# Patient Record
Sex: FEMALE | Race: BLACK OR AFRICAN AMERICAN | Employment: FULL TIME | URBAN - METROPOLITAN AREA
[De-identification: names, ages, dates, MRNs, and addresses within clinical notes are randomized per-mention and may not be internally consistent; named-entity substitution may affect disease eponyms.]

---

## 2020-06-29 ENCOUNTER — TELEPHONE (OUTPATIENT)
Dept: FAMILY MEDICINE CLINIC | Age: 65
End: 2020-06-29

## 2020-06-29 NOTE — TELEPHONE ENCOUNTER
Outbound call placed to patient. name and  verified. Call placed to reschedule patients appointment due to COVID-19 pandemic. Patient rescheduled to virtual visit with Dr. Diane Dorado on 2020. Patient reported understanding and appreciative of call.

## 2020-11-23 ENCOUNTER — OFFICE VISIT (OUTPATIENT)
Dept: FAMILY MEDICINE CLINIC | Age: 65
End: 2020-11-23
Payer: COMMERCIAL

## 2020-11-23 VITALS
DIASTOLIC BLOOD PRESSURE: 87 MMHG | SYSTOLIC BLOOD PRESSURE: 135 MMHG | HEIGHT: 67 IN | BODY MASS INDEX: 39.49 KG/M2 | RESPIRATION RATE: 24 BRPM | HEART RATE: 68 BPM | TEMPERATURE: 97.4 F | OXYGEN SATURATION: 97 % | WEIGHT: 251.6 LBS

## 2020-11-23 DIAGNOSIS — Z12.11 SCREENING FOR COLON CANCER: ICD-10-CM

## 2020-11-23 DIAGNOSIS — Z86.19 HISTORY OF HEPATITIS C: ICD-10-CM

## 2020-11-23 DIAGNOSIS — Z00.00 ROUTINE GENERAL MEDICAL EXAMINATION AT HEALTH CARE FACILITY: Primary | ICD-10-CM

## 2020-11-23 DIAGNOSIS — Z78.0 POSTMENOPAUSAL: ICD-10-CM

## 2020-11-23 DIAGNOSIS — M79.89 MASS OF SOFT TISSUE OF FACE: ICD-10-CM

## 2020-11-23 DIAGNOSIS — Z76.89 ENCOUNTER TO ESTABLISH CARE WITH NEW DOCTOR: ICD-10-CM

## 2020-11-23 DIAGNOSIS — Z13.220 SCREENING FOR LIPID DISORDERS: ICD-10-CM

## 2020-11-23 DIAGNOSIS — Z13.820 SCREENING FOR OSTEOPOROSIS: ICD-10-CM

## 2020-11-23 DIAGNOSIS — Z12.31 VISIT FOR SCREENING MAMMOGRAM: ICD-10-CM

## 2020-11-23 DIAGNOSIS — E66.01 SEVERE OBESITY (HCC): ICD-10-CM

## 2020-11-23 PROCEDURE — 99203 OFFICE O/P NEW LOW 30 MIN: CPT | Performed by: FAMILY MEDICINE

## 2020-11-23 PROCEDURE — 99387 INIT PM E/M NEW PAT 65+ YRS: CPT | Performed by: FAMILY MEDICINE

## 2020-11-23 RX ORDER — MELATONIN 10 MG/ML
DROPS ORAL
COMMUNITY

## 2020-11-23 NOTE — PROGRESS NOTES
Patient Name: Jc Lyles   MRN: [id number]    Svetlana Parikh is a 77 y.o. female who presents with the following: Here to establish care with new PCP. Colon Cancer Screening: reviewed screening modalities & elected for FOBT. Breast Cancer Screening: not up to date - ordered  DEXA: due  Hep C: hx of Hep C. Did Interferon B x 6 months in 1997. CAD risk factors:  HTN: wnl  BP Readings from Last 3 Encounters:   11/23/20 135/87     Lipid: due  DM: due    Accidentally hit her forehead with a gas pump in May. One month later, a bump developed on her forehead. Is slightly smaller than before but feels soft and squishy. Denies pain. Review of Systems   Constitutional: Negative for chills, fever, malaise/fatigue and weight loss. HENT: Negative for hearing loss, nosebleeds and sore throat. Respiratory: Negative for cough, sputum production, shortness of breath and wheezing. Cardiovascular: Negative for chest pain, palpitations, leg swelling and PND. Gastrointestinal: Negative for abdominal pain, blood in stool, constipation, diarrhea, nausea and vomiting. Genitourinary: Negative for dysuria, frequency and urgency. Musculoskeletal: Negative for back pain, falls, joint pain, myalgias and neck pain. Skin: Negative for itching and rash. Neurological: Negative for dizziness, sensory change, focal weakness and loss of consciousness. Psychiatric/Behavioral: Negative for depression. The patient is not nervous/anxious. All other systems reviewed and are negative. The patient's medications, allergies, past medical history, surgical history, family history and social history were reviewed and updated where appropriate. Prior to Admission medications    Medication Sig Start Date End Date Taking? Authorizing Provider   ascorbic acid (VITAMIN C DROPS PO) Take  by mouth. Yes Provider, Historical   Cholecalciferol, Vitamin D3, 10 mcg/drop (400 unit/drop) drop Take  by mouth. Yes Provider, Historical   B-complex with vitamin C (VITAMIN B COMPLEX-C PO) Take  by mouth. Yes Provider, Historical   MILK THISTLE PO Take  by mouth. Yes Provider, Historical   TURMERIC PO Take  by mouth. Yes Provider, Historical   GARLIC PO Take  by mouth. Yes Provider, Historical   CAPSICUM, CAYENNE, PO Take  by mouth. Yes Provider, Historical   green tea leaf extract (GREEN TEA PO) Take  by mouth.    Yes Provider, Historical       No Known Allergies      Past Medical History:   Diagnosis Date    Asthma     Hepatitis C 1997       Past Surgical History:   Procedure Laterality Date    HX BREAST LUMPECTOMY  1997       Family History   Problem Relation Age of Onset    Diabetes Mother     Diabetes Father        Social History     Socioeconomic History    Marital status:      Spouse name: Not on file    Number of children: Not on file    Years of education: Not on file    Highest education level: Not on file   Occupational History    Not on file   Social Needs    Financial resource strain: Not on file    Food insecurity     Worry: Not on file     Inability: Not on file    Transportation needs     Medical: Not on file     Non-medical: Not on file   Tobacco Use    Smoking status: Current Every Day Smoker     Packs/day: 8.00     Years: 20.00     Pack years: 160.00    Smokeless tobacco: Never Used   Substance and Sexual Activity    Alcohol use: Yes     Comment: occasional    Drug use: Not Currently     Types: Cocaine, Marijuana     Comment: 30 years ago    Sexual activity: Not Currently   Lifestyle    Physical activity     Days per week: Not on file     Minutes per session: Not on file    Stress: Not on file   Relationships    Social connections     Talks on phone: Not on file     Gets together: Not on file     Attends [employer] service: Not on file     Active member of club or organization: Not on file     Attends meetings of clubs or organizations: Not on file     Relationship status: Not on file    Intimate partner violence     Fear of current or ex partner: Not on file     Emotionally abused: Not on file     Physically abused: Not on file     Forced sexual activity: Not on file   Other Topics Concern    Not on file   Social History Narrative    Not on file         OBJECTIVE    Visit Vitals  /87   Pulse 68   Temp 97.4 °F (36.3 °C) (Temporal)   Resp 24   Ht 5' 7\" (1.702 m)   Wt 251 lb 9.6 oz (114.1 kg)   SpO2 97%   BMI 39.41 kg/m²       Physical Exam  Vitals signs and nursing note reviewed. Constitutional:       General: She is not in acute distress. Appearance: She is not diaphoretic. Eyes:      Conjunctiva/sclera: Conjunctivae normal.      Pupils: Pupils are equal, round, and reactive to light. Cardiovascular:      Rate and Rhythm: Normal rate and regular rhythm. Heart sounds: Normal heart sounds. No murmur. No friction rub. No gallop. Pulmonary:      Effort: Pulmonary effort is normal. No respiratory distress. Breath sounds: Normal breath sounds. No wheezing. Skin:     General: Skin is warm and dry. Comments: 3 x 3 cm palpable soft tissue mass along left forehead   Neurological:      Mental Status: She is alert. ASSESSMENT AND PLAN  Alfonso Tabor is a 77 y.o. female who presents today for:    1. Routine general medical examination at health care facility  Reviewed age appropriate screening tests as recommended by the [place]PSTF Preventive Services Database with patient today. 2. Encounter to establish care with new doctor    3. History of hepatitis C  - HCV AB W/RFLX TO ARLIN; Future  - HCV AB W/RFLX TO ARLIN    4. Mass of soft tissue of face  Will evaluate with US. Suspect possible hematoma; may need drainage by plastic surgery. - US HEAD NECK SOFT TISSUE; Future    5. Screening for lipid disorders  - CBC W/O DIFF; Future  - METABOLIC PANEL, COMPREHENSIVE; Future  - LIPID PANEL;  Future  - LIPID PANEL  - METABOLIC PANEL, COMPREHENSIVE  - CBC W/O DIFF    6. Screening for colon cancer  - COLOGUARD TEST (FECAL DNA COLORECTAL CANCER SCREENING)    7. Visit for screening mammogram  - [facility] MAMMO BI SCREENING INCL CAD; Future    8. Screening for osteoporosis  - DEXA BONE DENSITY STUDY AXIAL; Future    9. Postmenopausal  - DEXA BONE DENSITY STUDY AXIAL; Future    10. Severe obesity ([facility] [address]  I have reviewed/discussed the above normal BMI with the patient. I have recommended the following interventions: dietary management education, guidance, and counseling, encourage exercise, monitor weight and prescribed dietary intake. There are no discontinued medications. Follow-up and Dispositions    · Return in about 1 year (around 11/23/2021) for CPE (30 min). Treatment risks/benefits/costs/interactions/alternatives discussed with patient. Advised patient to call back or return to office if symptoms worsen/change/persist. If patient cannot reach us or should anything more severe/urgent arise he/she should proceed directly to the nearest emergency department. Discussed expected course/resolution/complications of diagnosis in detail with patient. Patient expressed understanding with the diagnosis and plan. Andrew Swanson M.D.

## 2020-11-23 NOTE — ASSESSMENT & PLAN NOTE
Uncontrolled, based on history, physical exam and review of pertinent labs, studies and medications; meds reconciled; continue current treatment plan. Key Obesity Meds     Patient is on no anti-obesity meds.         No results found for: LEPTN, INSUL, HBA1C, GLU, CHOL, CHOLPOCT, HDL, LDLC, LDL, LDLCEXT, LDLCPOC, TRIGL, TGLPOCT, TSH, NA, NAPOC, K, KPOCT, ALTPOC, ALT, ASTPOC, VITD3, CRP, [id number], TSHEXT

## 2020-11-23 NOTE — PATIENT INSTRUCTIONS
Colon Cancer Screening: Care Instructions Your Care Instructions Colorectal cancer occurs in the colon or rectum. That's the lower part of your digestive system. It is the second-leading cause of cancer deaths in the [place]. It often starts with small growths called polyps in the colon or rectum. Polyps are usually found with screening tests. Depending on the type of test, any polyps found may be removed during the tests. Colorectal cancer usually does not cause symptoms at first. But regular tests can help find it early, before it spreads and becomes harder to treat. Your risk for colorectal cancer gets higher as you get older. Some experts say that adults should start regular screening at age 48 and stop at age 76. Others say to start before age 48 or continue after age 76. Talk with your doctor about your risk and when to start and stop screening. You may have one of several tests. Follow-up care is a key part of your treatment and safety. Be sure to make and go to all appointments, and call your doctor if you are having problems. It's also a good idea to know your test results and keep a list of the medicines you take. What are the main screening tests for colon cancer? The screening tests are: 
Stool tests. These include the guaiac fecal occult blood test (gFOBT), the fecal immunochemical test (FIT), and the combined fecal immunochemical test and stool DNA test (FIT-DNA). These tests check stool samples for signs of cancer. If your test is positive, you will need to have a colonoscopy. Sigmoidoscopy. This test lets your doctor look at the lining of your rectum and the lowest part of your colon. Your doctor uses a lighted tube called a sigmoidoscope. This test can't find cancers or polyps in the upper part of your colon. In some cases, polyps that are found can be removed. But if your doctor finds polyps, you will need to have a colonoscopy to check the upper part of your colon. Colonoscopy. This test lets your doctor look at the lining of your rectum and your entire colon. The doctor uses a thin, flexible tool called a colonoscope. It can also be used to remove polyps or get a tissue sample (biopsy). A less common test is CT colonography (CTC). It's also called virtual colonoscopy. Who should be screened for colorectal cancer? Your risk for colorectal cancer gets higher as you get older. Some experts say that adults should start regular screening at age 48 and stop at age 76. Others say to start before age 48 or continue after age 76. Talk with your doctor about your risk and when to start and stop screening. How often you need screening depends on the type of test you get: 
Stool tests. Every 1 or 2 years for FIT or gFOBT. Every 3 years for sDNA, also called FIT-DNA. Tests that look inside the colon. Every 5 or 10 years for sigmoidoscopy. Every 5 years for CT colonography (virtual colonoscopy). Every 10 years for colonoscopy. Experts agree that people at higher risk may need to be tested sooner. This includes people who have a strong family history of colon cancer. Talk to your doctor about which test is best for you and when to be tested. When should you call for help? Watch closely for changes in your health, and be sure to contact your doctor if: 
  · You have any changes in your bowel habits.  
  · You have any problems. Where can you learn more? Go to http://www.gray.com/ Enter M541 in the search box to learn more about \"Colon Cancer Screening: Care Instructions. \" Current as of: April 29, 2020               Content Version: 12.6 © 2618-0299 [employer]. Care instructions adapted under license by [employer] (which disclaims liability or warranty for this information).  If you have questions about a medical condition or this instruction, always ask your healthcare professional. [address] any warranty or liability for your use of this information.

## 2020-11-23 NOTE — PROGRESS NOTES
Chief Complaint   Patient presents with   Lauren Lopez [facility]       1. Have you been to the ER, urgent care clinic since your last visit? Hospitalized since your last visit? No    2. Have you seen or consulted any other health care providers outside of the [address] [place] [address] since your last visit? Include any pap smears or colon screening. No    Health Maintenance Due   Topic Date Due    Hepatitis C Screening  1955    DTaP/Tdap/Td series (1 - Tdap) 06/14/1974    Lipid Screen  06/14/1993    Shingrix Vaccine Age 50> (1 of 2) 06/14/2003    Colorectal Cancer Screening Combo  06/14/2003    Breast Cancer Screen Mammogram  06/14/2003    GLAUCOMA SCREENING Q2Y  06/14/2018    Bone Densitometry (Dexa) Screening  06/14/2018    Pneumococcal 65+ years (1 of 1 - PPSV23) 06/14/2018    Flu Vaccine (1) 09/01/2020     Pt declines all vaccines. Pt had mammogram 2 years ago, but advised not to get them anymore. Pt has not had colonoscopy since 1980's  Glaucoma Screening done at [place] [address] 4 months ago. 3 most recent PHQ Screens 11/23/2020   Little interest or pleasure in doing things Not at all   Feeling down, depressed, irritable, or hopeless Not at all   Total Score PHQ 2 0       Abuse Screening Questionnaire 11/23/2020   Do you ever feel afraid of your partner? N   Are you in a relationship with someone who physically or mentally threatens you? N   Is it safe for you to go home? Y       No flowsheet data found.

## 2020-11-30 ENCOUNTER — TELEPHONE (OUTPATIENT)
Dept: FAMILY MEDICINE CLINIC | Age: 65
End: 2020-11-30

## 2020-11-30 DIAGNOSIS — Z86.19 HISTORY OF HEPATITIS C: Primary | ICD-10-CM

## 2020-11-30 LAB
ALBUMIN SERPL-MCNC: 3.7 G/DL (ref 3.5–5)
ALBUMIN/GLOB SERPL: 0.9 {RATIO} (ref 1.1–2.2)
ALP SERPL-CCNC: 83 U/L (ref 45–117)
ALT SERPL-CCNC: 48 U/L (ref 12–78)
ANION GAP SERPL CALC-SCNC: 10 MMOL/L (ref 5–15)
AST SERPL-CCNC: 29 U/L (ref 15–37)
BILIRUB SERPL-MCNC: 0.4 MG/DL (ref 0.2–1)
BUN SERPL-MCNC: 20 MG/DL (ref 6–20)
BUN/CREAT SERPL: 14 (ref 12–20)
CALCIUM SERPL-MCNC: 9.6 MG/DL (ref 8.5–10.1)
CHLORIDE SERPL-SCNC: 104 MMOL/L (ref 97–108)
CHOLEST SERPL-MCNC: 200 MG/DL
CO2 SERPL-SCNC: 25 MMOL/L (ref 21–32)
CREAT SERPL-MCNC: 1.46 MG/DL (ref 0.55–1.02)
ERYTHROCYTE [DISTWIDTH] IN BLOOD BY AUTOMATED COUNT: 13.2 % (ref 11.5–14.5)
GLOBULIN SER CALC-MCNC: 4 G/DL (ref 2–4)
GLUCOSE SERPL-MCNC: 92 MG/DL (ref 65–100)
HCT VFR BLD AUTO: 45.4 % (ref 35–47)
HCV AB S/CO SERPL IA: >11 S/CO RATIO (ref 0–0.9)
HCV RNA SERPL QL NAA+PROBE: NORMAL
HCV RNA SERPL QL NAA+PROBE: POSITIVE
HDLC SERPL-MCNC: 38 MG/DL
HDLC SERPL: 5.3 {RATIO} (ref 0–5)
HGB BLD-MCNC: 14.4 G/DL (ref 11.5–16)
LDLC SERPL CALC-MCNC: 127.4 MG/DL (ref 0–100)
LIPID PROFILE,FLP: ABNORMAL
MCH RBC QN AUTO: 30.6 PG (ref 26–34)
MCHC RBC AUTO-ENTMCNC: 31.7 G/DL (ref 30–36.5)
MCV RBC AUTO: 96.4 FL (ref 80–99)
NRBC # BLD: 0 K/UL (ref 0–0.01)
NRBC BLD-RTO: 0 PER 100 WBC
PLATELET # BLD AUTO: 344 K/UL (ref 150–400)
PMV BLD AUTO: 10.4 FL (ref 8.9–12.9)
POTASSIUM SERPL-SCNC: 4.7 MMOL/L (ref 3.5–5.1)
PROT SERPL-MCNC: 7.7 G/DL (ref 6.4–8.2)
RBC # BLD AUTO: 4.71 M/UL (ref 3.8–5.2)
SODIUM SERPL-SCNC: 139 MMOL/L (ref 136–145)
TRIGL SERPL-MCNC: 173 MG/DL (ref ?–150)
VLDLC SERPL CALC-MCNC: 34.6 MG/DL
WBC # BLD AUTO: 11.1 K/UL (ref 3.6–11)

## 2020-11-30 NOTE — TELEPHONE ENCOUNTER
Called, spoke to pt. Two pt identifiers confirmed. Writer informed patient that her referral had been mailed for Liver MD. Pt verbalized understanding of information discussed w/ no further questions at this time.

## 2020-12-16 ENCOUNTER — HOSPITAL ENCOUNTER (OUTPATIENT)
Dept: MAMMOGRAPHY | Age: 65
Discharge: HOME | End: 2020-12-16
Attending: FAMILY MEDICINE
Payer: COMMERCIAL

## 2020-12-16 ENCOUNTER — HOSPITAL ENCOUNTER (OUTPATIENT)
Dept: ULTRASOUND IMAGING | Age: 65
Discharge: HOME | End: 2020-12-16
Attending: FAMILY MEDICINE
Payer: COMMERCIAL

## 2020-12-16 DIAGNOSIS — Z13.820 SCREENING FOR OSTEOPOROSIS: ICD-10-CM

## 2020-12-16 DIAGNOSIS — M79.89 MASS OF SOFT TISSUE OF FACE: ICD-10-CM

## 2020-12-16 DIAGNOSIS — Z12.31 VISIT FOR SCREENING MAMMOGRAM: ICD-10-CM

## 2020-12-16 DIAGNOSIS — Z78.0 POSTMENOPAUSAL: ICD-10-CM

## 2020-12-16 PROCEDURE — 77080 DXA BONE DENSITY AXIAL: CPT

## 2020-12-16 PROCEDURE — 77067 SCR MAMMO BI INCL CAD: CPT

## 2020-12-16 PROCEDURE — 76536 US EXAM OF HEAD AND NECK: CPT

## 2020-12-21 DIAGNOSIS — M79.89 MASS OF SOFT TISSUE OF FACE: Primary | ICD-10-CM

## 2020-12-28 ENCOUNTER — TELEPHONE (OUTPATIENT)
Dept: FAMILY MEDICINE CLINIC | Age: 65
End: 2020-12-28

## 2021-01-05 ENCOUNTER — OFFICE VISIT (OUTPATIENT)
Dept: HEMATOLOGY | Age: 66
End: 2021-01-05
Payer: COMMERCIAL

## 2021-01-05 VITALS
HEIGHT: 67 IN | SYSTOLIC BLOOD PRESSURE: 135 MMHG | RESPIRATION RATE: 17 BRPM | HEART RATE: 78 BPM | TEMPERATURE: 96.1 F | DIASTOLIC BLOOD PRESSURE: 77 MMHG | BODY MASS INDEX: 39.08 KG/M2 | OXYGEN SATURATION: 98 % | WEIGHT: 249 LBS

## 2021-01-05 DIAGNOSIS — B18.2 CHRONIC HEPATITIS C WITHOUT HEPATIC COMA (HCC): Primary | ICD-10-CM

## 2021-01-05 PROCEDURE — 99204 OFFICE O/P NEW MOD 45 MIN: CPT | Performed by: HOSPITALIST

## 2021-01-05 NOTE — LETTER
1/5/2021 Patient: June Cooper YOB: 1955 Date of Visit: 1/5/2021 Nisreen Dubon MD 
[address] [facility] [address] [address] [address] [employer] Dear Nisreen Dubon MD, Thank you for referring Ms. Dallas Linares to [address] for evaluation. My notes for this consultation are attached. If you have questions, please do not hesitate to call me. I look forward to following your patient along with you. Sincerely, Catalina James MD

## 2021-01-05 NOTE — PROGRESS NOTES
Room 1    Identified pt with two pt identifiers(name and ). Reviewed record in preparation for visit and have obtained necessary documentation. All patient medications has been reviewed. No chief complaint on file. 3 most recent PHQ Screens 2020   Little interest or pleasure in doing things Not at all   Feeling down, depressed, irritable, or hopeless Not at all   Total Score PHQ 2 0     Abuse Screening Questionnaire 2020   Do you ever feel afraid of your partner? N   Are you in a relationship with someone who physically or mentally threatens you? N   Is it safe for you to go home? Y       Health Maintenance Due   Topic    GLAUCOMA SCREENING Q2Y      Health Maintenance Review: Patient reminded of \"due or due soon\" health maintenance. I have asked the patient to contact his/her primary care provider (PCP) for follow-up on his/her health maintenance. There were no vitals filed for this visit. Wt Readings from Last 3 Encounters:   20 251 lb 9.6 oz (114.1 kg)     Temp Readings from Last 3 Encounters:   20 97.4 °F (36.3 °C) (Temporal)     BP Readings from Last 3 Encounters:   20 135/87     Pulse Readings from Last 3 Encounters:   20 68       Coordination of Care Questionnaire:   1) Have you been to an emergency room, urgent care, or hospitalized since your last visit? No  2. Have seen or consulted any other health care provider since your last visit?   No

## 2021-01-05 NOTE — PROGRESS NOTES
Mel Pemberton MD, [address], [address] Martin Hernandes, Ivelisse Horne MD, MPH      Autumn Calero PA-LUPE Skinner, [facility]-BC     April S Berlin, [facility]   Skye Jackman, P-C    Susiehajajolene Hernandez, [facility]       Kelvin Gloria [address]    at [facility]    [address] [place] [address] Ave, [address] [address]  [address].    [phone]    FAX: [address] [facility] [address]    [address], [address]    [phone]    FAX: [phone]     Patient Care Team:  Titi Kuhn MD as PCP - General (Family Medicine)  Titi Kuhn MD as PCP - [address] [place] [address] Dr Martin Provider    Problem List  Date Reviewed: 1/5/2021          Codes Class Noted    Asthma ICD-10-CM: J45.909  ICD-9-CM: 493.90  Unknown        Severe obesity ([facility] [address] ICD-10-CM: E66.01  ICD-9-CM: 278.01  11/23/2020        Chronic hepatitis C without hepatic coma ([facility] [address] ICD-10-CM: B18.2  ICD-9-CM: 070.54  1997          The clinicians listed above have asked me to see   Channing Harrell in consultation regarding chronic HCV and its management. All medical records sent by the referring physicians were reviewed     The patient is a 77year old Black female who was screened for anti-HCV and tested positive in 1997      Risk factors for acquiring HCV are inhaling cocaine in the 1970's. Patient last inhaled cocaine in the 1970s. There was no history of an episode of acute icteric hepatitis at the time of these risk factors. Imaging of the liver was not performed. An assessment of liver fibrosis with biopsy or elastography has not been performed. The patient was treated with standard interferon- 2 shots per day for 6 months in 1997.     The patient tolerated treatment reasonably well and was told she was cured    Patient most recent HCV RNA QL performed  On 11/2020 was positive    The patient has no symptoms which can be attributed to the liver disorder. The patient is not currently experiencing the following symptoms of liver disease: fatigue, pain in the right side over the liver,   yellowing of the eyes or skin, problems concentrating, swelling of the abdomen, swelling of the lower extremities, hematemesis, hematochezia. The patient completes all daily activities without any functional limitations. ASSESSMENT AND PLAN:  Chronic HCV   Chronic HCV of unclear severity. Liver transaminases are normal. ALP is normal. Liver function is normal. The platelet count is normal.    Will perform and/or review results of HCV viral load, HCV genotype to define the specific treatment and duration of treatment that will be required. Will perform  serologic and virologic studies to assess for other causes of chronic liver disease. Will perform imaging of the liver with ultrasound. The need to perform an assessment of liver fibrosis was discussed with the patient. The Fibroscan can assess liver fibrosis and determine if a patient has advanced fibrosis or cirrhosis without the need for liver biopsy. This will be performed at the next office visit. Chronic HCV Treatment  The patient was previously treated for HCV with interferon   The previous treatment response was a null response    The patient should be treated with Harvoni (sofasbuvir and ledipasvir) Mavyret (glecaprevir and piprentasvir) Epclusa (sofosbuvir and valpitasvir)   Zepetier (Grazaprevir and Elbasvir) Vosevi (sofosbuvir, valpitasvir, voxalaprevir) and ribavirin for 8 weeks -12 weeks. The SVR/cure rate is over 95%. Screening for Hepatocellular Carcinoma  HCC screening is not necessary if the patient has no evidence of cirrhosis.     Treatment of other medical problems in patients with chronic liver disease  There are no contraindications for the patient to take most medications that are necessary for treatment of other medical issues. Substance Use  The patient was counseled regarding the risk of overdose and death from using opioids and other narcotic drugs. Discussed the risk of becoming reinfected with HCV once they are cured if they resume IV drug use or inhaling drugs nasally. The patient has not used drugs since the 1970''s    Vaccinations   The need for vaccination against viral hepatitis A and B will be assessed with serologic and instituted as appropriate. Routine vaccinations against other bacterial and viral agents can be performed as indicated. Annual flu vaccination should be administered if indicated. ALLERGIES  No Known Allergies    MEDICATIONS  Current Outpatient Medications   Medication Sig    ascorbic acid (VITAMIN C DROPS PO) Take  by mouth.  Cholecalciferol, Vitamin D3, 10 mcg/drop (400 unit/drop) drop Take  by mouth.  B-complex with vitamin C (VITAMIN B COMPLEX-C PO) Take  by mouth.  MILK THISTLE PO Take  by mouth.  TURMERIC PO Take  by mouth.  GARLIC PO Take  by mouth.  CAPSICUM, CAYENNE, PO Take  by mouth.  green tea leaf extract (GREEN TEA PO) Take  by mouth. No current facility-administered medications for this visit. SYSTEM REVIEW NOT RELATED TO LIVER DISEASE OR REVIEWED ABOVE:  Constitution systems: Negative for fever, chills, weight gain, weight loss. Eyes: Negative for visual changes. ENT: Negative for sore throat, painful swallowing. Respiratory: Negative for cough, hemoptysis, SOB. Cardiology: Negative for chest pain, palpitations. GI:  Negative for constipation or diarrhea. : Negative for urinary frequency, dysuria, hematuria, nocturia. Skin: Negative for rash. Hematology: Negative for easy bruising, blood clots. Musculo-skelatal: Negative for back pain, muscle pain, weakness.   Neurologic: Negative for headaches, dizziness, vertigo, memory problems not related to HE.  Psychology: Negative for anxiety, depression. FAMILY HISTORY:  The patient are   There is no family history of liver disease. The following family members have liver disease: There is no family history of immune disorders. SOCIAL HISTORY:  The patient is . The patient has1 daughter, 3 grandchildren and 2 great grandchildren  The patient currently smokes 1 pack of tobacco daily. The patient drinks alcohol occasionally  The patient currently works full time as a     PHYSICAL EXAMINATION:  Visit Vitals  /77 (BP 1 Location: Right arm, BP Patient Position: Sitting)   Pulse 78   Temp (!) 96.1 °F (35.6 °C) (Temporal)   Resp 17   Ht 5' 7\" (1.702 m)   Wt 249 lb (112.9 kg)   SpO2 98%   BMI 39.00 kg/m²     General: No acute distress. Eyes: Sclera anicteric. ENT: No oral lesions. Thyroid normal.  Nodes: No adenopathy. Skin: No spider angiomata. No jaundice. No palmar erythema. Respiratory: Lungs clear to auscultation. Cardiovascular: Regular heart rate. No murmurs. No JVD. Abdomen: Soft non-tender. Liver size normal to percussion/palpation. Spleen not palpable. No obvious ascites. Extremities: No edema. No muscle wasting. No gross arthritic changes. Neurologic: Alert and oriented. Cranial nerves grossly intact. No asterixis. LABORATORY STUDIES:  Recent liver function panel, CBC with platelet count and BMP are not available. These studies will be performed.   [facility] [place] of [address] [place] [address] Ref Rng & Units 2020   WBC 3.6 - 11.0 K/uL 11.1 (H)   HGB 11.5 - 16.0 g/dL 14.4    - 400 K/uL 344   AST 15 - 37 U/L 29   ALT 12 - 78 U/L 48   Alk Phos 45 - 117 U/L 83   Bili, Total 0.2 - 1.0 MG/DL 0.4   Albumin 3.5 - 5.0 g/dL 3.7   BUN 6 - 20 MG/DL 20   Creat 0.55 - 1.02 MG/DL 1.46 (H)   Na 136 - 145 mmol/L 139   K 3.5 - 5.1 mmol/L 4.7   Cl 97 - 108 mmol/L 104   CO2 21 - 32 mmol/L 25   Glucose 65 - 100 mg/dL 92     SEROLOGIES:  Not available or performed. Testing was performed today. Serologies Latest Ref Rng & Units 11/23/2020   Hep C Ab 0.0 - 0.9 s/co ratio >11.0 (H)   IVER HISTOLOGY:  Not available or performed    ENDOSCOPIC PROCEDURES:  Not available or performed    RADIOLOGY:  Not available or performed    OTHER TESTING:  Not available or performed    FOLLOW-UP:  All of the issues listed above in the Assessment and Plan were discussed with the patient. All questions were answered. The patient expressed a clear understanding of the above. [address] [place] [address] in 4 weeks for Fibroscan and to review all data and determine the treatment plan.     Noé Melton MD, MPH  Advanced Hepatology  [facility] of [address] [facility] [address] [address] Alexy Norris, [address] [place] [address] 22.  [phone]  [address] [place] [address]

## 2021-01-27 ENCOUNTER — HOSPITAL ENCOUNTER (OUTPATIENT)
Dept: LAB | Age: 66
Discharge: HOME | End: 2021-01-27
Attending: HOSPITALIST
Payer: COMMERCIAL

## 2021-01-27 ENCOUNTER — HOSPITAL ENCOUNTER (OUTPATIENT)
Dept: ULTRASOUND IMAGING | Age: 66
Discharge: HOME | End: 2021-01-27
Attending: HOSPITALIST
Payer: COMMERCIAL

## 2021-01-27 DIAGNOSIS — B18.2 CHRONIC HEPATITIS C WITHOUT HEPATIC COMA (HCC): ICD-10-CM

## 2021-01-27 PROCEDURE — 76700 US EXAM ABDOM COMPLETE: CPT

## 2021-01-28 LAB
HAV AB SER QL IA: POSITIVE
HBV CORE AB SERPL QL IA: NEGATIVE
HBV SURFACE AB SER QL: NON REACTIVE
HBV SURFACE AG SERPL QL IA: NEGATIVE
HIV 1+2 AB+HIV1 P24 AG SERPL QL IA: NON REACTIVE

## 2021-01-30 LAB
HCV GENTYP SERPL NAA+PROBE: NORMAL
PLEASE NOTE, 550474: NORMAL

## 2021-01-31 LAB
HCV RNA SERPL NAA+PROBE-ACNC: NORMAL IU/ML
HCV RNA SERPL NAA+PROBE-LOG IU: 6.81 LOG10 IU/ML
HCV RNA SERPL QL NAA+PROBE: POSITIVE
TEST INFORMATION: NORMAL

## 2021-03-02 ENCOUNTER — OFFICE VISIT (OUTPATIENT)
Dept: HEMATOLOGY | Age: 66
End: 2021-03-02
Payer: COMMERCIAL

## 2021-03-02 ENCOUNTER — TELEPHONE (OUTPATIENT)
Dept: HEMATOLOGY | Age: 66
End: 2021-03-02

## 2021-03-02 VITALS
TEMPERATURE: 96.6 F | RESPIRATION RATE: 20 BRPM | BODY MASS INDEX: 39.21 KG/M2 | HEIGHT: 67 IN | SYSTOLIC BLOOD PRESSURE: 126 MMHG | HEART RATE: 64 BPM | OXYGEN SATURATION: 97 % | WEIGHT: 249.8 LBS | DIASTOLIC BLOOD PRESSURE: 71 MMHG

## 2021-03-02 DIAGNOSIS — N13.30 HYDRONEPHROSIS OF RIGHT KIDNEY: Primary | ICD-10-CM

## 2021-03-02 DIAGNOSIS — B18.2 CHRONIC HEPATITIS C WITHOUT HEPATIC COMA (HCC): ICD-10-CM

## 2021-03-02 PROCEDURE — 99214 OFFICE O/P EST MOD 30 MIN: CPT | Performed by: HOSPITALIST

## 2021-03-02 PROCEDURE — 91200 LIVER ELASTOGRAPHY: CPT | Performed by: HOSPITALIST

## 2021-03-02 NOTE — TELEPHONE ENCOUNTER
----- Message from Jane Chen MD sent at 3/2/2021 12:09 PM EST -----  Regarding: Urology consult  Ant Norman,    I have consulted uorlogy for this patient. Is there a way you can help expedite her clinic appointment with Urology. I want her to have her right hydronephrosis taken care of first before We begin treatment with HCV. Thanks,    [email or web address] Scheduled with Dr. Caleb Luevano 3/3/21@ 3pm.Faxed scans and will fax today progress note when completed. Tried calling patient x2 left detail message with date/time of appt.

## 2021-03-02 NOTE — LETTER
3/2/2021 Patient: Eric Esquivel YOB: 1955 Date of Visit: 3/2/2021 Mary Tomlinson MD 
[address] [facility] [address] [id number] [address] [employer] Dear Mary Tomlinson MD, Thank you for referring Ms. Omid Sims to [address] for evaluation. My notes for this consultation are attached. If you have questions, please do not hesitate to call me. I look forward to following your patient along with you. Sincerely, Caron Copeland MD

## 2021-03-02 NOTE — PROGRESS NOTES
Identified pt with two pt identifiers(name and ). Reviewed record in preparation for visit and have obtained necessary documentation. Chief Complaint   Patient presents with    Hepatitis C      Vitals:    21 1100   BP: 126/71   Pulse: 64   Resp: 20   Temp: (!) 96.6 °F (35.9 °C)   TempSrc: Temporal   SpO2: 97%   Weight: 249 lb 12.8 oz (113.3 kg)   Height: 5' 7\" (1.702 m)   PainSc:   0 - No pain       Health Maintenance Review: Patient reminded of \"due or due soon\" health maintenance. I have asked the patient to contact his/her primary care provider (PCP) for follow-up on his/her health maintenance. Coordination of Care Questionnaire:  :   1) Have you been to an emergency room, urgent care, or hospitalized since your last visit? If yes, where when, and reason for visit? no       2. Have seen or consulted any other health care provider since your last visit? If yes, where when, and reason for visit? NO      Patient is accompanied by self I have received verbal consent from [address] [place] [address] to discuss any/all medical information while they are present in the room.

## 2021-03-02 NOTE — PROGRESS NOTES
Alesia Galvin MD, [facility], [address] Martin Hernandes, Garret Drake MD, MPH      Mray Anne Carvajal, DONALD Martin, ACN-BC     Jes Slade, [facility]   Marcus Aguirre, FNP-C    Pedro Luis Remingtoncrystal, [facility]       Kelvin Sharmauta [address]    at [facility]    [address], [address] [facility], [address]    [phone]    FAX: [address] [place] [address], [place] [address]., [address]    [phone]    FAX: [phone]     Patient Care Team:  Dhara Nolasco MD as PCP - General (Family Medicine)  Dhara Nolasco MD as PCP - [facility] Provider    Problem List  Date Reviewed: 1/5/2021          Codes Class Noted    Asthma ICD-10-CM: J45.909  ICD-9-CM: 493.90  Unknown        Severe obesity ([facility] [address] ICD-10-CM: E66.01  ICD-9-CM: 278.01  11/23/2020        Chronic hepatitis C without hepatic coma ([facility] [address] ICD-10-CM: B18.2  ICD-9-CM: 070.54  Ariane Lindsey in consultation regarding chronic HCV and its management. All medical records sent by the referring physicians were reviewed     The patient is a 77year old Black female who was screened for anti-HCV and tested positive in 1997      Risk factors for acquiring HCV are inhaling cocaine in the 1970's. Patient last inhaled cocaine in the 1970s. There was no history of an episode of acute icteric hepatitis at the time of these risk factors. HCV RNA performed on 1/2021 was 6,510,000. Hep C genotype 1a    Imaging of the liver performed on  1/2021 demonstrated no hepatic mass. There was also significant right hydroureteronephrosis with cortical thinning compatible with long standing obstruction    FibroScan performed at [facility] [place] today. EkPa was 5.8. IQR/med 10%. .  The results suggested a fibrosis level of F0. The CAP score suggest there is hepatic steatosis. The patient reports she was treated with standard interferon- 2 shots per day for 6 months in 1997. The patient tolerated treatment reasonably well and was told she was cured    The patient has no symptoms which can be attributed to the liver disorder. The patient is not currently experiencing the following symptoms of liver disease: fatigue, pain in the right side over the liver,   yellowing of the eyes or skin, problems concentrating, swelling of the abdomen, swelling of the lower extremities, hematemesis, hematochezia. The patient completes all daily activities without any functional limitations. ASSESSMENT AND PLAN:  Chronic HCV       Liver transaminases are normal. ALP is normal. Liver function is normal. The platelet count is normal.      Fibro scan performed showed Ekpa score of 5.8 which shows F0 fibrosis    HCV Viral load 6,510,000, genotype 1a    Imaging of the liver showed no focal hepatic lesion but significant right sided hydroureteronephrosis. Chronic HCV Treatment  The patient was previously treated for HCV with interferon   The previous treatment response was a null response    The patient should be treated with Mavyret (glecaprevir and piprentasvir) for 8 weeks or with Epclusa (sofosbuvir and valpitasvir) for a duration of 12 weeks    The SVR/cure rate is over 95%. We will treat HCV after management of right hydroureteronephrosis. Screening for Hepatocellular Carcinoma  HCC screening is not necessary if the patient has no evidence of cirrhosis. Treatment of other medical problems in patients with chronic liver disease  There are no contraindications for the patient to take most medications that are necessary for treatment of other medical issues. Substance Use  The patient was counseled regarding the risk of overdose and death from using opioids and other narcotic drugs.   Discussed the risk of becoming reinfected with HCV once they are cured if they resume IV drug use or inhaling drugs nasally. The patient has not used drugs since the s    Vaccinations   The need for vaccination against viral hepatitis A is not needed  Routine vaccinations against other bacterial and viral agents can be performed as indicated. Annual flu vaccination should be administered if indicated. Right hydroureteronephrosis  We will consult urology due to obstructive uropathy     ALLERGIES  No Known Allergies    MEDICATIONS  Current Outpatient Medications   Medication Sig    ascorbic acid (VITAMIN C DROPS PO) Take  by mouth.  Cholecalciferol, Vitamin D3, 10 mcg/drop (400 unit/drop) drop Take  by mouth.  B-complex with vitamin C (VITAMIN B COMPLEX-C PO) Take  by mouth.  MILK THISTLE PO Take  by mouth.  TURMERIC PO Take  by mouth.  GARLIC PO Take  by mouth.  CAPSICUM, CAYENNE, PO Take  by mouth.  green tea leaf extract (GREEN TEA PO) Take  by mouth. No current facility-administered medications for this visit. SYSTEM REVIEW NOT RELATED TO LIVER DISEASE OR REVIEWED ABOVE:  Constitution systems: Negative for fever, chills, weight gain, weight loss. Eyes: Negative for visual changes. ENT: Negative for sore throat, painful swallowing. Respiratory: Negative for cough, hemoptysis, SOB. Cardiology: Negative for chest pain, palpitations. GI:  Negative for constipation or diarrhea. : Negative for urinary frequency, dysuria, hematuria, nocturia. Skin: Negative for rash. Hematology: Negative for easy bruising, blood clots. Musculo-skelatal: Negative for back pain, muscle pain, weakness. Neurologic: Negative for headaches, dizziness, vertigo, memory problems not related to HE. Psychology: Negative for anxiety, depression. FAMILY HISTORY:  The patient are   There is no family history of liver disease. The following family members have liver disease:   There is no family history of immune disorders. SOCIAL HISTORY:  The patient is . The patient has1 daughter, 3 grandchildren and 2 great grandchildren  The patient currently smokes 1 pack of tobacco daily. The patient drinks alcohol occasionally  The patient currently works full time as a     PHYSICAL EXAMINATION:  Visit Vitals  /71 (BP 1 Location: Right upper arm, BP Patient Position: Sitting, BP Cuff Size: Large adult)   Pulse 64   Temp (!) 96.6 °F (35.9 °C) (Temporal)   Resp 20   Ht 5' 7\" (1.702 m)   Wt 249 lb 12.8 oz (113.3 kg)   SpO2 97%   BMI 39.12 kg/m²     General: No acute distress. Eyes: Sclera anicteric. ENT: No oral lesions. Thyroid normal.  Nodes: No adenopathy. Skin: No spider angiomata. No jaundice. No palmar erythema. Respiratory: Lungs clear to auscultation. Cardiovascular: Regular heart rate. No murmurs. No JVD. Abdomen: Soft non-tender. Liver size normal to percussion/palpation. Spleen not palpable. No obvious ascites. Extremities: No edema. No muscle wasting. No gross arthritic changes. Neurologic: Alert and oriented. Cranial nerves grossly intact. No asterixis. LABORATORY STUDIES:  Recent liver function panel, CBC with platelet count and BMP are not available. These studies will be performed. [facility] [place] of [address] [place] [address] Ref Rng & Units 11/23/2020   WBC 3.6 - 11.0 K/uL 11.1 (H)   HGB 11.5 - 16.0 g/dL 14.4    - 400 K/uL 344   AST 15 - 37 U/L 29   ALT 12 - 78 U/L 48   Alk Phos 45 - 117 U/L 83   Bili, Total 0.2 - 1.0 MG/DL 0.4   Albumin 3.5 - 5.0 g/dL 3.7   BUN 6 - 20 MG/DL 20   Creat 0.55 - 1.02 MG/DL 1.46 (H)   Na 136 - 145 mmol/L 139   K 3.5 - 5.1 mmol/L 4.7   Cl 97 - 108 mmol/L 104   CO2 21 - 32 mmol/L 25   Glucose 65 - 100 mg/dL 92     SEROLOGIES:  Not available or performed. Testing was performed today.     Serologies Latest Ref Rng & Units 11/23/2020   Hep C Ab 0.0 - 0.9 s/co ratio >11.0 (H)     Serologies Latest Ref Rng & Units 1/27/2021 11/23/2020   Hep A Ab, Total Negative Positive (A)    Hep B Surface Ag Negative Negative    Hep B Core Ab, Total Negative Negative    Hep B Surface AB QL  Non Reactive    Hep C Ab 0.0 - 0.9 s/co ratio  >11.0 (H)   Hep C Genotype  1a    HCV RT-PCR, Quant IU/mL 6,510,000      LIVER HISTOLOGY:  3/2/2021: FibroScan performed at [address]. EkPa was 5.8. IQR/med 10%. . The results suggested a fibrosis level of F0. The CAP score suggest there is hepatic steatosis. ENDOSCOPIC PROCEDURES:  Not available or performed    RADIOLOGY:  1/2021: Liver US: No hepatic mass is identified. Cholelithiasis without evidence to suggest acute cholecystitis. Common bile duct distention and pancreatic duct  distention of uncertain etiology and clinical significance. Significant right hydroureteronephrosis with cortical thinning compatible with long-standing obstruction    OTHER TESTING:  Not available or performed    FOLLOW-UP:  All of the issues listed above in the Assessment and Plan were discussed with the patient. All questions were answered. The patient expressed a clear understanding of the above.     Follow-up [address] in 4 weeks for HCV treatment    Jordan Luque MD, MPH  Advanced Hepatology  [facility] of [address], [address] [place] [address].  [phone]  [address] [place] [address]

## 2021-04-01 ENCOUNTER — TRANSCRIBE ORDER (OUTPATIENT)
Dept: SCHEDULING | Age: 66
End: 2021-04-01

## 2021-04-01 DIAGNOSIS — R91.8 MULTIPLE PULMONARY NODULES: Primary | ICD-10-CM

## 2021-04-06 ENCOUNTER — HOSPITAL ENCOUNTER (OUTPATIENT)
Dept: CT IMAGING | Age: 66
End: 2021-04-06
Attending: INTERNAL MEDICINE

## 2021-04-14 ENCOUNTER — TRANSCRIBE ORDER (OUTPATIENT)
Dept: SCHEDULING | Age: 66
End: 2021-04-14

## 2021-04-14 DIAGNOSIS — R91.8 MULTIPLE PULMONARY NODULES: Primary | ICD-10-CM

## 2021-04-21 ENCOUNTER — HOSPITAL ENCOUNTER (OUTPATIENT)
Dept: CT IMAGING | Age: 66
Discharge: HOME | End: 2021-04-21
Attending: INTERNAL MEDICINE
Payer: COMMERCIAL

## 2021-04-21 DIAGNOSIS — R91.8 MULTIPLE PULMONARY NODULES: ICD-10-CM

## 2021-04-21 PROCEDURE — 71250 CT THORAX DX C-: CPT

## 2021-04-26 ENCOUNTER — TRANSCRIBE ORDER (OUTPATIENT)
Dept: SCHEDULING | Age: 66
End: 2021-04-26

## 2021-04-26 DIAGNOSIS — N13.30 HYDRONEPHROSIS: Primary | ICD-10-CM

## 2021-05-06 ENCOUNTER — TRANSCRIBE ORDER (OUTPATIENT)
Dept: SCHEDULING | Age: 66
End: 2021-05-06

## 2021-05-06 DIAGNOSIS — R91.8 MULTIPLE PULMONARY NODULES: Primary | ICD-10-CM

## 2021-05-10 ENCOUNTER — HOSPITAL ENCOUNTER (OUTPATIENT)
Dept: NUCLEAR MEDICINE | Age: 66
Discharge: HOME | End: 2021-05-10
Attending: UROLOGY
Payer: COMMERCIAL

## 2021-05-10 DIAGNOSIS — N13.30 HYDRONEPHROSIS: ICD-10-CM

## 2021-05-10 PROCEDURE — 74011250636 HC RX REV CODE- 250/636: Performed by: UROLOGY

## 2021-05-10 PROCEDURE — 78708 K FLOW/FUNCT IMAGE W/DRUG: CPT

## 2021-05-10 RX ORDER — FUROSEMIDE 10 MG/ML
20 INJECTION INTRAMUSCULAR; INTRAVENOUS
Status: COMPLETED | OUTPATIENT
Start: 2021-05-10 | End: 2021-05-10

## 2021-05-10 RX ADMIN — FUROSEMIDE 20 MG: 10 INJECTION, SOLUTION INTRAMUSCULAR; INTRAVENOUS at 09:10

## 2021-05-12 ENCOUNTER — TRANSCRIBE ORDER (OUTPATIENT)
Dept: SCHEDULING | Age: 66
End: 2021-05-12

## 2021-05-12 ENCOUNTER — HOSPITAL ENCOUNTER (OUTPATIENT)
Dept: LAB | Age: 66
Discharge: HOME | End: 2021-05-12

## 2021-05-12 ENCOUNTER — DOCUMENTATION ONLY (OUTPATIENT)
Dept: SURGERY | Age: 66
End: 2021-05-12

## 2021-05-12 ENCOUNTER — OFFICE VISIT (OUTPATIENT)
Dept: SURGERY | Age: 66
End: 2021-05-12
Payer: COMMERCIAL

## 2021-05-12 VITALS
DIASTOLIC BLOOD PRESSURE: 63 MMHG | WEIGHT: 249 LBS | BODY MASS INDEX: 39.08 KG/M2 | SYSTOLIC BLOOD PRESSURE: 121 MMHG | HEIGHT: 67 IN | HEART RATE: 58 BPM

## 2021-05-12 DIAGNOSIS — N63.20 BREAST MASS, LEFT: Primary | ICD-10-CM

## 2021-05-12 DIAGNOSIS — F17.219 NICOTINE DEPENDENCE, CIGARETTES, WITH UNSPECIFIED NICOTINE-INDUCED DISORDERS: Primary | ICD-10-CM

## 2021-05-12 DIAGNOSIS — R92.8 ABNORMAL ULTRASOUND OF BREAST: ICD-10-CM

## 2021-05-12 PROCEDURE — 99203 OFFICE O/P NEW LOW 30 MIN: CPT | Performed by: SURGERY

## 2021-05-12 PROCEDURE — 19083 BX BREAST 1ST LESION US IMAG: CPT | Performed by: SURGERY

## 2021-05-12 PROCEDURE — 76642 ULTRASOUND BREAST LIMITED: CPT | Performed by: SURGERY

## 2021-05-12 RX ORDER — PREDNISOLONE ACETATE 10 MG/ML
SUSPENSION/ DROPS OPHTHALMIC
COMMUNITY
Start: 2021-04-29

## 2021-05-12 RX ORDER — OFLOXACIN 3 MG/ML
SOLUTION/ DROPS OPHTHALMIC
COMMUNITY
Start: 2021-04-29

## 2021-05-12 NOTE — PATIENT INSTRUCTIONS
Mammogram: About This Test 
What is it? A mammogram is an X-ray of the breast that is used to screen for breast cancer. This test can find tumors that are too small for you or your doctor to feel. Cancer is most easily treated when it is found at an early stage. Why is this test done? A mammogram is done to: 
· Look for breast cancer in women who don't have symptoms. · Find breast cancer in women who have symptoms. Symptoms of breast cancer may include a lump or thickening in the breast, nipple discharge, or dimpling of the skin on one area of the breast. 
· Find an area of suspicious breast tissue to remove for an exam under a microscope (biopsy). How do you prepare for the test? 
If you've had a mammogram before at another clinic, have the results sent or bring them with you to your appointment. On the day of the mammogram, don't use any deodorant. And don't use perfume, powders, or ointments near or on your breasts. The residue left on your skin by these substances may interfere with the X-rays. How is the test done? · You will need to take off any jewelry that might interfere with the X-ray pictures. · You will need to take off your clothes above the waist. 
· You will be given a cloth or paper gown to use during the test. 
· You probably will stand during the mammogram. 
· One at a time, your breasts will be placed on a flat plate. · Another plate is then pressed firmly against your breast to help flatten out the breast tissue. You may be asked to lift your arm. · For a few seconds while the X-ray picture is being taken, you will need to hold your breath. · At least two pictures are taken of each breast. One is taken from the top and one from the side. How does having a mammogram feel? A mammogram is often uncomfortable but rarely painful.  If you have sensitive or fragile skin or a skin condition, let the technician know before you have your exam. If you have menstrual periods, the procedure is more comfortable when done within 2 weeks after your period has ended. Having your breasts flattened is usually uncomfortable, but it helps the technician get the best images. How long does the test take? · The test will take about 10 to 15 minutes. You may be in the clinic for up to an hour. · You may be asked to wait a few minutes while the images are checked to make sure they don't need to be redone. What happens after the test? 
· You will probably be able to go home right away. · You can go back to your usual activities right away. Follow-up care is a key part of your treatment and safety. Be sure to make and go to all appointments, and call your doctor if you are having problems. It's also a good idea to keep a list of the medicines you take. Ask your doctor when you can expect to have your test results. Where can you learn more? Go to http://www.gray.com/ Enter V660 in the search box to learn more about \"Mammogram: About This Test.\" Current as of: December 17, 2020               Content Version: 12.8 © 1538-4468 [employer]. Care instructions adapted under license by [employer] (which disclaims liability or warranty for this information). If you have questions about a medical condition or this instruction, always ask your healthcare professional. [address] any warranty or liability for your use of this information.

## 2021-05-12 NOTE — PROGRESS NOTES
[facility] [place] [facility]  OFFICE PROCEDURE PROGRESS NOTE        Chart reviewed for the following:   Jaden Matias MD, have reviewed the History, Physical and updated the Allergic reactions for Gerrye Has performed immediately prior to start of procedure:   Jaden Matias MD, have performed the following reviews on Levester March prior to the start of the procedure:            * Patient was identified by name and date of birth   * Agreement on procedure being performed was verified  * Risks and Benefits explained to the patient  * Procedure site verified and marked as necessary  * Patient was positioned for comfort  * Consent was signed and verified     Time: 2:30 pm      Date of procedure: 5/12/2021    Procedure performed by:  Faby Echols MD    Provider assisted by: Olvin Canas RN    Patient assisted by: self    How tolerated by patient: tolerated the procedure well with no complications    Post Procedural Pain Scale: 0 - No Hurt    Comments: Patient tolerated the procedure well without complications. Denies pain post biopsy.

## 2021-05-12 NOTE — PROGRESS NOTES
HISTORY OF PRESENT ILLNESS Blayne Garcia is a 77 y.o. female. HPI NEW Patient presents for consultation at the request of Dr. Chandan Solorzano for abnormal finding of LEFT breast on Chest CT. The patient has no abnormal breast symptoms to report but states that she often gets called back after her mammograms. History of breast biopsies in 1996, benign per patient. No family history of breast or ovarian cancer. Breast imaging- 
CT Results (most recent): 
Results from Hospital Encounter encounter on 04/21/21 CT CHEST WO CONT Narrative INDICATION: Lung nodules COMPARISON: None CONTRAST: None. TECHNIQUE:  5 mm axial images were obtained through the thorax. Coronal and 
sagittal reformats were generated. CT dose reduction was achieved through use 
of a standardized protocol tailored for this examination and automatic exposure 
control for dose modulation. The absence of intravenous contrast reduces the sensitivity for evaluation of 
the mediastinum, carlota, vasculature, and upper abdominal organs. FINDINGS: 
 
CHEST WALL: No mass or axillary lymphadenopathy. THYROID: No nodule. MEDIASTINUM: No mass or lymphadenopathy. CARLOTA: No mass or lymphadenopathy. THORACIC AORTA: No aneurysm. MAIN PULMONARY ARTERY: Normal in caliber. TRACHEA/BRONCHI: Patent. ESOPHAGUS: No wall thickening or dilatation. HEART: Normal in size. PLEURA: No effusion or pneumothorax. LUNGS: There are multiple pulmonary nodules throughout the lungs. The largest 
are seen in the right upper lobe (9.2 mm), and in the right lower lobe (5.5 mm), 
right lower lobe, 7.0 mm). 15 mm left subareolar nodule. INCIDENTALLY IMAGED UPPER ABDOMEN: Right nephrolithiasis with right 
hydronephrosis. BONES: No destructive bone lesion. Impression 1. Multiple bilateral pulmonary nodules. Strong clinical correlation is 
recommended. 2. 15 mm left subareolar nodule. Correlation with mammography is recommended.   
3. Right nephrolithiasis and right hydronephrosis. Guidelines for Management of Incidental Pulmonary Nodules Detected on CT Images: 
from the Fleischner Society 2017 LOW-RISK PATIENTS: 
 
LESS THAN OR EQUAL TO 6 MM:  No routine follow-up needed. GREATER THAN 6-8 MM: CT at 6-12 months, if multiple at 3-6 months, then consider CT at 18-24 months. GREATER THAN 8 MM:  Consider CT at 3 months, PET/CT, or tissue sampling. If 
multiple CT at 3-6 months, then consider CT at 18-24 months. HIGH-RISK PATIENTS: 
 
LESS THAN OR EQUAL TO 6 MM:  Optional CT at 12 months. GREATER THAN 6-8 MM: CT at 6-12 months, if multiple at 3-6 months, then CT at 
18-24 months. GREATER THAN 8 MM:  Consider CT at 3 months, PET/CT, or tissue sampling. If 
multiple CT at 3-6 months, then at 18-24 months. Guidelines for Management of Incidental Pulmonary Nodules Detected on CT : A Statement from the [address] [place] [address] 2017\"  Yokasta Solethan. Radiology 2017; 000:1-16 [facility] Results (most recent): 
Results from Hospital Encounter encounter on 12/16/20 [facility] MAMMO BI SCREENING INCL CAD Narrative STUDY: Bilateral digital screening mammogram 
 
INDICATION:  Screening. COMPARISON: 2017 BREAST COMPOSITION:  There are scattered areas of fibroglandular density. FINDINGS: Bilateral digital screening mammography was performed and is 
interpreted in conjunction with a computer assisted detection (CAD) system. No 
suspicious masses or calcifications are identified. There has been no 
significant change. Impression IMPRESSION: 
BI-RADS 1: Negative. No mammographic evidence of malignancy. RECOMMENDATIONS: 
Next screening mammogram is recommended in one year. The patient will be notified of these results. Review of Systems Constitutional: Negative. HENT: Negative. Eyes: Negative. Respiratory: Negative. Cardiovascular: Negative. Gastrointestinal: Negative. Genitourinary: Negative. Musculoskeletal: Negative. Skin: Negative. Neurological: Negative. Endo/Heme/Allergies: Negative. Psychiatric/Behavioral: Negative. Physical Exam 
 
ASSESSMENT and PLAN 
{ASSESSMENT/PLAN:[id number]}

## 2021-05-12 NOTE — PROGRESS NOTES
HISTORY OF PRESENT ILLNESS  Juanis Osborn is a 77 y.o. female. HPI  NEW Patient presents for consultation at the request of Dr. Lidia Barfield for abnormal finding of LEFT breast on Chest CT. The patient has no abnormal breast symptoms to report but states that she often gets called back after her mammograms. History of breast biopsies in 1996, benign per patient.     No family history of breast or ovarian cancer.     Breast imaging-  CT Results (most recent):       Results from Hospital Encounter encounter on 04/21/21   CT CHEST WO CONT     Narrative INDICATION: Lung nodules     COMPARISON: None     CONTRAST: None.     TECHNIQUE:  5 mm axial images were obtained through the thorax. Coronal and  sagittal reformats were generated. CT dose reduction was achieved through use  of a standardized protocol tailored for this examination and automatic exposure  control for dose modulation.     The absence of intravenous contrast reduces the sensitivity for evaluation of  the mediastinum, carlota, vasculature, and upper abdominal organs.     FINDINGS:     CHEST WALL: No mass or axillary lymphadenopathy. THYROID: No nodule. MEDIASTINUM: No mass or lymphadenopathy. CARLOTA: No mass or lymphadenopathy. THORACIC AORTA: No aneurysm. MAIN PULMONARY ARTERY: Normal in caliber. TRACHEA/BRONCHI: Patent. ESOPHAGUS: No wall thickening or dilatation. HEART: Normal in size. PLEURA: No effusion or pneumothorax. LUNGS: There are multiple pulmonary nodules throughout the lungs. The largest  are seen in the right upper lobe (9.2 mm), and in the right lower lobe (5.5 mm),  right lower lobe, 7.0 mm). 15 mm left subareolar nodule. INCIDENTALLY IMAGED UPPER ABDOMEN: Right nephrolithiasis with right  hydronephrosis. BONES: No destructive bone lesion.        Impression 1. Multiple bilateral pulmonary nodules. Strong clinical correlation is  recommended. 2. 15 mm left subareolar nodule. Correlation with mammography is recommended.    3. Right nephrolithiasis and right hydronephrosis.     Guidelines for Management of Incidental Pulmonary Nodules Detected on CT Images:  from the Fle[employer] 2017     LOW-RISK PATIENTS:     LESS THAN OR EQUAL TO 6 MM:  No routine follow-up needed. GREATER THAN 6-8 MM: CT at 6-12 months, if multiple at 3-6 months, then consider  CT at 18-24 months. GREATER THAN 8 MM:  Consider CT at 3 months, PET/CT, or tissue sampling. If  multiple CT at 3-6 months, then consider CT at 18-24 months.        HIGH-RISK PATIENTS:     LESS THAN OR EQUAL TO 6 MM:  Optional CT at 12 months. GREATER THAN 6-8 MM: CT at 6-12 months, if multiple at 3-6 months, then CT at  18-24 months. GREATER THAN 8 MM:  Consider CT at 3 months, PET/CT, or tissue sampling. If  multiple CT at 3-6 months, then at 18-24 months.     Guidelines for Management of Incidental Pulmonary Nodules Detected on CT : A  Statement from the [address] [place] [address] 2017\"  Romie Huynh. Radiology  2017; 000:1-16            [facility] Results (most recent):       Results from [place] encounter on 12/16/20   [facility] MAMMO BI SCREENING INCL CAD     Narrative STUDY: Bilateral digital screening mammogram     INDICATION:  Screening.     COMPARISON: 2017     BREAST COMPOSITION:  There are scattered areas of fibroglandular density.     FINDINGS: Bilateral digital screening mammography was performed and is  interpreted in conjunction with a computer assisted detection (CAD) system. No  suspicious masses or calcifications are identified. There has been no  significant change.        Impression IMPRESSION:  BI-RADS 1: Negative. No mammographic evidence of malignancy.     RECOMMENDATIONS:  Next screening mammogram is recommended in one year.      The patient will be notified of these results.        Past Medical History:   Diagnosis Date    Asthma     Hepatitis C 1997       Past Surgical History:   Procedure Laterality Date    HX BREAST BIOPSY Bilateral 1997    Benign Social History     Socioeconomic History    Marital status:      Spouse name: Not on file    Number of children: Not on file    Years of education: Not on file    Highest education level: Not on file   Occupational History    Not on file   Social Needs    Financial resource strain: Not on file    Food insecurity     Worry: Not on file     Inability: Not on file    Transportation needs     Medical: Not on file     Non-medical: Not on file   Tobacco Use    Smoking status: Current Every Day Smoker     Packs/day: 8.00     Years: 20.00     Pack years: 160.00    Smokeless tobacco: Never Used   Substance and Sexual Activity    Alcohol use: Yes     Comment: occasional    Drug use: Not Currently     Types: Cocaine, Marijuana     Comment: 30 years ago    Sexual activity: Not Currently   Lifestyle    Physical activity     Days per week: Not on file     Minutes per session: Not on file    Stress: Not on file   Relationships    Social connections     Talks on phone: Not on file     Gets together: Not on file     Attends Congregation service: Not on file     Active member of club or organization: Not on file     Attends meetings of clubs or organizations: Not on file     Relationship status: Not on file    Intimate partner violence     Fear of current or ex partner: Not on file     Emotionally abused: Not on file     Physically abused: Not on file     Forced sexual activity: Not on file   Other Topics Concern    Not on file   Social History Narrative    Not on file       Current Outpatient Medications on File Prior to Visit   Medication Sig Dispense Refill    ofloxacin (FLOXIN) 0.3 % ophthalmic solution INSTILL 1 DROP IN RIGHT EYE FOUR TIMES DAILY      prednisoLONE acetate (PRED FORTE) 1 % ophthalmic suspension SHAKE LIQUID AND INSTILL 1 DROP IN RIGHT EYE FOUR TIMES DAILY      ascorbic acid (VITAMIN C DROPS PO) Take  by mouth.       Cholecalciferol, Vitamin D3, 10 mcg/drop (400 unit/drop) drop Take  by mouth.  B-complex with vitamin C (VITAMIN B COMPLEX-C PO) Take  by mouth.  MILK THISTLE PO Take  by mouth.  TURMERIC PO Take  by mouth.  GARLIC PO Take  by mouth.  CAPSICUM, CAYENNE, PO Take  by mouth.  green tea leaf extract (GREEN TEA PO) Take  by mouth. No current facility-administered medications on file prior to visit. No Known Allergies    OB History    No obstetric history on file. Obstetric Comments   Menarche:  10. LMP: 2000. # of Children:  1. Age at Delivery of First Child:  10.   Hysterectomy/oophorectomy:  ?/no. Breast Bx:  yes. Hx of Breast Feeding:  no. BCP:  no. Hormone therapy:  no.                  ROS  Constitutional: Negative. HENT: Negative. Eyes: Negative. Respiratory: Negative. Cardiovascular: Negative. Gastrointestinal: Negative. Genitourinary: Negative. Musculoskeletal: Negative. Skin: Negative. Neurological: Negative. Endo/Heme/Allergies: Negative. Psychiatric/Behavioral: Negative. Physical Exam  Exam conducted with a chaperone present. Cardiovascular:      Rate and Rhythm: Normal rate and regular rhythm. Heart sounds: Normal heart sounds. Pulmonary:      Breath sounds: Normal breath sounds. Chest:      Breasts: Breasts are symmetrical.         Right: Normal. No swelling, bleeding, inverted nipple, mass, nipple discharge, skin change or tenderness. Left: Normal. No swelling, bleeding, inverted nipple, mass, nipple discharge, skin change or tenderness. Lymphadenopathy:      Cervical:      Right cervical: No superficial, deep or posterior cervical adenopathy. Left cervical: No superficial, deep or posterior cervical adenopathy. Upper Body:      Right upper body: No supraclavicular or axillary adenopathy. Left upper body: No supraclavicular or axillary adenopathy. BREAST ULTRASOUND  Indication: Abnormal CT finding, LEFT breast  Technique:  The area was scanned using a high-frequency linear-array near-field transducer  Findings: 18mm complex cystic mass at 12:00 retroareolar  Impression: Complex cyst  Disposition: Aspiration, with bx if not completely aspirated. Aspiration  US Guided  Indication: LEFT breast complex cystic mass, 12:00, retroareolar  Findings: We used Ultrasound for Lesion location and guidance for the procedure. Prep: We cleaned the skin with alcohol. Anesthesia: We anesthetized with Xylocaine. Guidance: We used Ultrasound for guidance. Aspiration: We advanced an 18 gauge needle into the fluid collection. Yield: Nil. Effect: Solid mass remained. Specimen: N/A  Disposition: Bx      US-GUIDED CORE BIOPSY  Following detailed explanation and description of the biopsy procedure, its risk, benefits and possible alternatives, the patient signed the informed consent. Indication: Mass, Ultrasound Visible, LEFT breast 12:00, retroareolar   Prep: We cleansed the skin with alcohol. Anesthesia: We anesthetized the skin and underlying tissues with 1% lidocaine with epinephrine. Device: We advanced the Gimado Marquee device through the lesion and captured tissue with real-time ultrasound confirmation. Core Sampling: We repeated this sampling for the following number of cores, 4. Marker: We placed a marking clip to delores the biopsy site. Marker Type: HydroMARK. Dressing: We then closed the incision with steristrips and placed a sterile dressing. Instructions: The patient was instructed regarding post-procedure care and activities. Pathology: Pending at this time. Patient tolerated procedure well and discharged in stable condition. Informed patient that they will be notified of pathology results in 3 to 5 days. ASSESSMENT and PLAN    ICD-10-CM ICD-9-CM    1. Breast mass, left  N63.20 611.72    2.  Abnormal ultrasound of breast  R92.8 793.89       New patient presents for abnormal finding of LEFT breast on chest CT, and is doing well overall. No palpable mass on exam. LEFT breast US visualizes 18mm complex cystic mass at 12:00 retroareolar. Discussed aspiration, with bx if cyst does not completely aspirate, and pt elected to proceed. No fluid was aspirated from the area, so pt agreed to proceed with bx. She tolerated these procedures well. 4 cores were taken, [facility] and the [facility] marker placed. Will send bx specimens for PATH and f/u with results. This plan was reviewed with the patient and patient agrees. All questions were answered. Total time spent was 40 minutes.     Written by Theodora Anand, as dictated by Dr. Salome Ulloa MD.

## 2021-05-17 ENCOUNTER — TELEPHONE (OUTPATIENT)
Dept: SURGERY | Age: 66
End: 2021-05-17

## 2021-05-21 ENCOUNTER — DOCUMENTATION ONLY (OUTPATIENT)
Dept: SURGERY | Age: 66
End: 2021-05-21

## 2021-06-01 DIAGNOSIS — N63.20 MASS OF LEFT BREAST: Primary | ICD-10-CM

## 2021-06-18 ENCOUNTER — TRANSCRIBE ORDER (OUTPATIENT)
Dept: REGISTRATION | Age: 66
End: 2021-06-18

## 2021-06-18 DIAGNOSIS — Z01.812 PRE-PROCEDURE LAB EXAM: Primary | ICD-10-CM

## 2021-06-20 ENCOUNTER — HOSPITAL ENCOUNTER (OUTPATIENT)
Dept: PREADMISSION TESTING | Age: 66
Discharge: HOME | End: 2021-06-20
Payer: COMMERCIAL

## 2021-06-20 DIAGNOSIS — Z01.812 PRE-PROCEDURE LAB EXAM: ICD-10-CM

## 2021-06-20 PROCEDURE — U0005 INFEC AGEN DETEC AMPLI PROBE: HCPCS

## 2021-06-21 LAB
SARS-COV-2, XPLCVT: NOT DETECTED
SOURCE, COVRS: NORMAL

## 2021-06-23 RX ORDER — ALBUTEROL SULFATE 0.83 MG/ML
2.5 SOLUTION RESPIRATORY (INHALATION) AS NEEDED
Status: CANCELLED | OUTPATIENT
Start: 2021-06-23

## 2021-06-23 RX ORDER — MORPHINE SULFATE 2 MG/ML
2 INJECTION, SOLUTION INTRAMUSCULAR; INTRAVENOUS
Status: CANCELLED | OUTPATIENT
Start: 2021-06-23

## 2021-06-23 RX ORDER — HYDROCODONE BITARTRATE AND ACETAMINOPHEN 5; 325 MG/1; MG/1
1 TABLET ORAL AS NEEDED
Status: CANCELLED | OUTPATIENT
Start: 2021-06-23

## 2021-06-23 RX ORDER — FENTANYL CITRATE 50 UG/ML
50 INJECTION, SOLUTION INTRAMUSCULAR; INTRAVENOUS AS NEEDED
Status: CANCELLED | OUTPATIENT
Start: 2021-06-23

## 2021-06-23 RX ORDER — SODIUM CHLORIDE, SODIUM LACTATE, POTASSIUM CHLORIDE, CALCIUM CHLORIDE 600; 310; 30; 20 MG/100ML; MG/100ML; MG/100ML; MG/100ML
1000 INJECTION, SOLUTION INTRAVENOUS CONTINUOUS
Status: CANCELLED | OUTPATIENT
Start: 2021-06-23 | End: 2021-06-24

## 2021-06-23 RX ORDER — MIDAZOLAM HYDROCHLORIDE 1 MG/ML
1 INJECTION, SOLUTION INTRAMUSCULAR; INTRAVENOUS AS NEEDED
Status: CANCELLED | OUTPATIENT
Start: 2021-06-23

## 2021-06-23 RX ORDER — FENTANYL CITRATE 50 UG/ML
25 INJECTION, SOLUTION INTRAMUSCULAR; INTRAVENOUS
Status: CANCELLED | OUTPATIENT
Start: 2021-06-23

## 2021-06-23 RX ORDER — SODIUM CHLORIDE 9 MG/ML
25 INJECTION, SOLUTION INTRAVENOUS CONTINUOUS
Status: CANCELLED | OUTPATIENT
Start: 2021-06-23 | End: 2021-06-24

## 2021-06-23 RX ORDER — ROPIVACAINE HYDROCHLORIDE 5 MG/ML
30 INJECTION, SOLUTION EPIDURAL; INFILTRATION; PERINEURAL AS NEEDED
Status: CANCELLED | OUTPATIENT
Start: 2021-06-23

## 2021-06-23 RX ORDER — ACETAMINOPHEN 325 MG/1
650 TABLET ORAL ONCE
Status: CANCELLED | OUTPATIENT
Start: 2021-06-23 | End: 2021-06-23

## 2021-06-23 RX ORDER — HYDROMORPHONE HYDROCHLORIDE 1 MG/ML
0.2 INJECTION, SOLUTION INTRAMUSCULAR; INTRAVENOUS; SUBCUTANEOUS
Status: CANCELLED | OUTPATIENT
Start: 2021-06-23

## 2021-06-23 RX ORDER — LIDOCAINE HYDROCHLORIDE 10 MG/ML
0.1 INJECTION, SOLUTION EPIDURAL; INFILTRATION; INTRACAUDAL; PERINEURAL AS NEEDED
Status: CANCELLED | OUTPATIENT
Start: 2021-06-23

## 2021-06-23 RX ORDER — SODIUM CHLORIDE 9 MG/ML
25 INJECTION, SOLUTION INTRAVENOUS CONTINUOUS
Status: CANCELLED | OUTPATIENT
Start: 2021-06-23

## 2021-06-23 RX ORDER — SODIUM CHLORIDE, SODIUM LACTATE, POTASSIUM CHLORIDE, CALCIUM CHLORIDE 600; 310; 30; 20 MG/100ML; MG/100ML; MG/100ML; MG/100ML
1000 INJECTION, SOLUTION INTRAVENOUS CONTINUOUS
Status: CANCELLED | OUTPATIENT
Start: 2021-06-23

## 2021-06-23 RX ORDER — GLYCOPYRROLATE 0.2 MG/ML
0.2 INJECTION INTRAMUSCULAR; INTRAVENOUS
Status: CANCELLED | OUTPATIENT
Start: 2021-06-23 | End: 2021-06-24

## 2021-06-23 RX ORDER — DIPHENHYDRAMINE HYDROCHLORIDE 50 MG/ML
12.5 INJECTION, SOLUTION INTRAMUSCULAR; INTRAVENOUS AS NEEDED
Status: CANCELLED | OUTPATIENT
Start: 2021-06-23 | End: 2021-06-23

## 2021-06-23 RX ORDER — MIDAZOLAM HYDROCHLORIDE 1 MG/ML
0.5 INJECTION, SOLUTION INTRAMUSCULAR; INTRAVENOUS
Status: CANCELLED | OUTPATIENT
Start: 2021-06-23

## 2021-06-23 RX ORDER — ONDANSETRON 2 MG/ML
4 INJECTION INTRAMUSCULAR; INTRAVENOUS AS NEEDED
Status: CANCELLED | OUTPATIENT
Start: 2021-06-23

## 2021-06-24 ENCOUNTER — HOSPITAL ENCOUNTER (OUTPATIENT)
Age: 66
Setting detail: OUTPATIENT SURGERY
Discharge: HOME | End: 2021-06-24
Attending: SURGERY | Admitting: SURGERY
Payer: COMMERCIAL

## 2021-06-24 ENCOUNTER — DOCUMENTATION ONLY (OUTPATIENT)
Dept: SURGERY | Age: 66
End: 2021-06-24

## 2021-06-24 PROCEDURE — 77030040922 HC BLNKT HYPOTHRM STRY -A

## 2021-07-08 ENCOUNTER — NURSE NAVIGATOR (OUTPATIENT)
Dept: CASE MANAGEMENT | Age: 66
End: 2021-07-08

## 2021-07-08 NOTE — PROGRESS NOTES
[address]  Breast Navigator Encounter    Name:    Nic Garcia  Age:    74 y.o. Encounter type:  []Patient Initiated  [x]Navigator Follow-up []Pre-op  []Post-op  []Check-in Prior to First Treatment []Treatment Modality Change  []Survivorship Transition []Other:       Narrative:   Reached out to the patient at the request of Dr. Johana Thomas. She was unable to have surgery on 6/24 because she did not have a ride home from the hospital.  She did not reschedule her surgery. She was not available, phone went immediately to voicemail. I left a voicemail message for the patient asking her to come to her appt on 7/23 to discuss with Dr. Johana Thomas whether surgery should be rescheduled or if there are other options, such as observation. Alternatively, I told her she could just call and reschedule the surgery. I will continue to follow.            Referrals/Handouts:            Bryanna Blakely RN, BSN, [facility]  Oncology Breast Navigator     [address]  [address] [place] [address], [facility].  W: [phone]  F: [phone]  [email or web address]  Good Help to Those in [facility]

## 2021-07-15 ENCOUNTER — NURSE NAVIGATOR (OUTPATIENT)
Dept: SURGERY | Age: 66
End: 2021-07-15

## 2021-07-15 NOTE — PROGRESS NOTES
[address]  Breast Navigator Encounter    Name:    Jeferson Alejandra  Age:    74 y.o. Diagnosis:    LEFT breast mass    Interdisciplinary Team:  Med-Onc:      Surg-Onc:    Dr. Valentine Colonugh:    Plastics:    :     Nurse Navigator:  Adilson Trevizo RN, BSN, [facility]      Encounter type:  []Patient Initiated  [x]Navigator Follow-up []Pre-op  []Post-op  []Check-in Prior to First Treatment []Treatment Modality Change  []Survivorship Transition []Other:       Narrative:    Patient initially referred by Dr. Lakeshia Salgado for abnormal chest CT revealing a breast mass. Patient presented for in-office biopsy, which was benign, but slightly inconclusive so removal was planned. Patient showed up for her excisional biopsy on 6/23/2021, however she had no ride home, so surgery had to be cancelled. She did have a follow-up with Dr. Chary Ball on 5/10/4429, but I noticed this was cancelled. I reached out to the patient on 7/8/2021 and today, but was only able to leave a message on her voicemail. I called Dr. Katerin Glez office (pulmonology and referring provider) and left a message with the [facility] that patient did not have surgery due to no ride home and that I have been unable to reach her to reschedule or have her follow-up. I spoke to Dr. Chary Ball yesterday, and we agreed on the above plan. Will see if patient follows up.         Referrals/Handouts:            Adilson Trevizo RN, BSN, [facility]  Oncology Breast Navigator     [address]  [address] [place], [place] [address]  22.  W: [phone]  F: [phone]  [email or web address]  Good Help to Those in [facility]

## 2021-08-20 ENCOUNTER — OFFICE VISIT (OUTPATIENT)
Dept: HEMATOLOGY | Age: 66
End: 2021-08-20
Payer: COMMERCIAL

## 2021-08-20 VITALS
TEMPERATURE: 97.9 F | DIASTOLIC BLOOD PRESSURE: 76 MMHG | RESPIRATION RATE: 20 BRPM | BODY MASS INDEX: 37.51 KG/M2 | SYSTOLIC BLOOD PRESSURE: 119 MMHG | OXYGEN SATURATION: 97 % | HEART RATE: 68 BPM | HEIGHT: 67 IN | WEIGHT: 239 LBS

## 2021-08-20 DIAGNOSIS — B18.2 CHRONIC HEPATITIS C WITHOUT HEPATIC COMA (HCC): Primary | ICD-10-CM

## 2021-08-20 PROCEDURE — 99214 OFFICE O/P EST MOD 30 MIN: CPT | Performed by: HOSPITALIST

## 2021-08-20 RX ORDER — VELPATASVIR AND SOFOSBUVIR 100; 400 MG/1; MG/1
1 TABLET, FILM COATED ORAL DAILY
Qty: 28 TABLET | Refills: 2 | Status: SHIPPED | OUTPATIENT
Start: 2021-08-20 | End: 2021-12-01 | Stop reason: ALTCHOICE

## 2021-08-20 NOTE — PROGRESS NOTES
Aaron Mace is a 74 y.o. female    Chief Complaint   Patient presents with    Follow-up       1. Have you been to the ER, urgent care clinic since your last visit? Hospitalized since your last visit? No    2. Have you seen or consulted any other health care providers outside of the [address] [place] [address] since your last visit? Include any pap smears or colon screening.  No    Visit Vitals  /76   Pulse 68   Temp 97.9 °F (36.6 °C)   Resp 20   Ht 5' 7\" (1.702 m)   Wt 239 lb (108.4 kg)   SpO2 97%   BMI 37.43 kg/m²

## 2021-08-20 NOTE — LETTER
8/20/2021    Patient: Prachi Jiménez   YOB: 1955   Date of Visit: 8/20/2021     Aaron Rosario MD  [address] [facility]    Dear Aaron Rosario MD,      Thank you for referring Ms. Jadyn Gay to [address] for evaluation. My notes for this consultation are attached. If you have questions, please do not hesitate to call me. I look forward to following your patient along with you.       Sincerely,    aLmont Todd MD

## 2021-10-14 ENCOUNTER — OFFICE VISIT (OUTPATIENT)
Dept: HEMATOLOGY | Age: 66
End: 2021-10-14
Payer: COMMERCIAL

## 2021-10-14 VITALS
BODY MASS INDEX: 37.83 KG/M2 | WEIGHT: 241 LBS | DIASTOLIC BLOOD PRESSURE: 88 MMHG | OXYGEN SATURATION: 96 % | HEART RATE: 74 BPM | RESPIRATION RATE: 24 BRPM | TEMPERATURE: 97.7 F | HEIGHT: 67 IN | SYSTOLIC BLOOD PRESSURE: 138 MMHG

## 2021-10-14 DIAGNOSIS — B18.2 CHRONIC HEPATITIS C WITHOUT HEPATIC COMA (HCC): Primary | ICD-10-CM

## 2021-10-14 PROCEDURE — 99213 OFFICE O/P EST LOW 20 MIN: CPT | Performed by: HOSPITALIST

## 2021-10-14 NOTE — PROGRESS NOTES
Darryl Aguilar is a 74 y.o. female    Chief Complaint   Patient presents with    Follow-up     1. Have you been to the ER, urgent care clinic since your last visit? Hospitalized since your last visit? No    2. Have you seen or consulted any other health care providers outside of the [address] [place] [address] since your last visit? Include any pap smears or colon screening.   No     Visit Vitals  /88   Pulse 74   Temp 97.7 °F (36.5 °C)   Resp 24   Ht 5' 7\" (1.702 m)   Wt 241 lb (109.3 kg)   SpO2 96%   BMI 37.75 kg/m²

## 2021-10-14 NOTE — LETTER
10/14/2021    Patient: Stanislav Washington   YOB: 1955   Date of Visit: 10/14/2021     Chitra Pablo MD  [address] [facility]    Dear Chitra Pablo MD,      Thank you for referring Ms. Sherwin Powers to [address] for evaluation. My notes for this consultation are attached. If you have questions, please do not hesitate to call me. I look forward to following your patient along with you.       Sincerely,    Socorro Alcaraz MD

## 2021-10-14 NOTE — PROGRESS NOTES
Dinesh Flood [address]      Mili Salmon MD, Leslie Suarez, Olga Sheriff MD, MPH      Mavis Colbert, PA-LUPE Pascual, [facility]     Jes Slade, [facility]   Mariama Plummer [facility]    Itzel Dutta, [facility]       Kelvin Gloria Boris [address]    at [address], [facility] Kang Salazar  22.    [phone]    FAX: [address] [place] [address], [address]    [phone]    FAX: [phone]     Patient Care Team:  Fay Brown MD as PCP - General (Family Medicine)  Fay Brown MD as PCP - [facility] Empaneled Provider  Rosalie Carrasco MD as Referring Provider (Pulmonary Disease)    Problem List  Date Reviewed: 8/20/2021        Codes Class Noted    Hydronephrosis of right kidney ICD-10-CM: N13.30  ICD-9-CM: 591  3/2/2021        Asthma ICD-10-CM: J45.909  ICD-9-CM: 493.90  Unknown        Severe obesity ([facility] ICD-10-CM: E66.01  ICD-9-CM: 278.01  11/23/2020        Chronic hepatitis C without hepatic coma ([facility] ICD-10-CM: B18.2  ICD-9-CM: 070.54  Magda Salazar returns to the liver institute of [facility] for follow up and management of chronic HCV    All medical records sent by the referring physicians were reviewed     The patient is a 77year old Black female who was screened for anti-HCV and tested positive in 1997      Risk factors for acquiring HCV are inhaling cocaine in the 1970's. Patient last inhaled cocaine in the 1970s. There was no history of an episode of acute icteric hepatitis at the time of these risk factors. HCV RNA performed on 1/2021 was 6,510,000. Hep C genotype 1a    Imaging of the liver performed on 1/2021 demonstrated no hepatic mass.  There was also significant right hydroureteronephrosis with cortical thinning compatible with long standing obstruction    She underwent right kidney lithotripsy in 04/2021 by Dr Анна Moreno. FibroScan performed at [address]. This showed EkPa was 5.8. IQR/med 10%. . The results suggested a fibrosis level of F0. The CAP score suggest there is hepatic steatosis. The patient reports she was treated with standard interferon- 2 shots per day for 6 months in 1997. The patient tolerated treatment reasonably well and was told she was cured. However, HCV RNA performed in the clinic showed she has not achieved SVR cure    She was started on Epclusa on last clinic visit    The patient has no symptoms which can be attributed to the liver disorder. The patient is not currently experiencing the following symptoms of liver disease: fatigue, pain in the right side over the liver,   yellowing of the eyes or skin, problems concentrating, swelling of the abdomen, swelling of the lower extremities, hematemesis, hematochezia. The patient completes all daily activities without any functional limitations. Since last clinic visit  She was last seen in the clinic in 08/2021  She was started on Epclusa 08/2021 till date  She is tolerating the medication well without side effect    There is no abdominal pain over the liver. No jaundice      ASSESSMENT AND PLAN:  Chronic HCV   Liver transaminases are normal. ALP is normal. Liver function is normal. The platelet count is normal.      Fibro scan performed showed Ekpa score of 5.8 which correlate with F0 fibrosis  HCV Viral load 6,510,000, genotype 1a  Imaging of the liver showed no focal hepatic lesion. Chronic HCV Treatment  The patient was previously treated for HCV with interferon   The previous treatment response was a null response  The patient is on Epclusa 08/2021 till date  The SVR/cure rate is over 95%.   We will repeat HCV RNA viral load today, end of therapy and 3 months post end of therapy to determine SVR/cure    Screening for Hepatocellular Carcinoma  HCC screening is not necessary if the patient has no evidence of cirrhosis. Substance Use  The patient was counseled regarding the risk of overdose and death from using opioids and other narcotic drugs. Discussed the risk of becoming reinfected with HCV once they are cured if they resume IV drug use or inhaling drugs nasally. The patient has not used drugs since the 1970''s    Vaccinations   The need for vaccination against viral hepatitis A is not needed  Routine vaccinations against other bacterial and viral agents can be performed as indicated. Annual flu vaccination should be administered if indicated. Right hydroureteronephrosis  She is status post right lithotripsy     ALLERGIES  No Known Allergies    MEDICATIONS  Current Outpatient Medications   Medication Sig    sofosbuvir-velpatasvir (Epclusa) 400-100 mg tablet Take 1 Tablet by mouth daily. Indications: chronic infection of genotype 1 hepatitis C virus    ofloxacin (FLOXIN) 0.3 % ophthalmic solution INSTILL 1 DROP IN RIGHT EYE FOUR TIMES DAILY    prednisoLONE acetate (PRED FORTE) 1 % ophthalmic suspension SHAKE LIQUID AND INSTILL 1 DROP IN RIGHT EYE FOUR TIMES DAILY    ascorbic acid (VITAMIN C DROPS PO) Take  by mouth.  Cholecalciferol, Vitamin D3, 10 mcg/drop (400 unit/drop) drop Take  by mouth.  B-complex with vitamin C (VITAMIN B COMPLEX-C PO) Take  by mouth.  MILK THISTLE PO Take  by mouth.  TURMERIC PO Take  by mouth.  GARLIC PO Take  by mouth.  CAPSICUM, CAYENNE, PO Take  by mouth.  green tea leaf extract (GREEN TEA PO) Take  by mouth. No current facility-administered medications for this visit. SYSTEM REVIEW NOT RELATED TO LIVER DISEASE OR REVIEWED ABOVE:  Constitution systems: Negative for fever, chills, weight gain, weight loss. Eyes: Negative for visual changes. ENT: Negative for sore throat, painful swallowing.    Respiratory: Negative for cough, hemoptysis, SOB. Cardiology: Negative for chest pain, palpitations. GI:  Negative for constipation or diarrhea. : Negative for urinary frequency, dysuria, hematuria, nocturia. Skin: Negative for rash. Hematology: Negative for easy bruising, blood clots. Musculo-skelatal: Negative for back pain, muscle pain, weakness. Neurologic: Negative for headaches, dizziness, vertigo, memory problems not related to HE. Psychology: Negative for anxiety, depression. FAMILY HISTORY:  The patient are   There is no family history of liver disease. The following family members have liver disease: There is no family history of immune disorders. SOCIAL HISTORY:  The patient is . The patient has1 daughter, 3 grandchildren and 2 great grandchildren  The patient currently smokes 1 pack of tobacco daily. The patient drinks alcohol occasionally  The patient currently works full time as a     PHYSICAL EXAMINATION:  Visit Vitals  /88   Pulse 74   Temp 97.7 °F (36.5 °C)   Resp 24   Ht 5' 7\" (1.702 m)   Wt 241 lb (109.3 kg)   SpO2 96%   BMI 37.75 kg/m²     General: No acute distress. Eyes: Sclera anicteric. ENT: No oral lesions. Thyroid normal.  Nodes: No adenopathy. Skin: No spider angiomata. No jaundice. No palmar erythema. Respiratory: Lungs clear to auscultation. Cardiovascular: Regular heart rate. No murmurs. No JVD. Abdomen: Soft non-tender. Liver size normal to percussion/palpation. Spleen not palpable. No obvious ascites. Extremities: No edema. No muscle wasting. No gross arthritic changes. Neurologic: Alert and oriented. Cranial nerves grossly intact. No asterixis. LABORATORY STUDIES:  Recent liver function panel, CBC with platelet count and BMP are not available. These studies will be performed.   Liver [place] of [address] Units 2020   WBC 3.6 - 11.0 K/uL 11.1 (H)   HGB 11.5 - 16.0 g/dL 14.4    - 400 K/uL 344   AST 15 - 37 U/L 29   ALT 12 - 78 U/L 48   Alk Phos 45 - 117 U/L 83   Bili, Total 0.2 - 1.0 MG/DL 0.4   Albumin 3.5 - 5.0 g/dL 3.7   BUN 6 - 20 MG/DL 20   Creat 0.55 - 1.02 MG/DL 1.46 (H)   Na 136 - 145 mmol/L 139   K 3.5 - 5.1 mmol/L 4.7   Cl 97 - 108 mmol/L 104   CO2 21 - 32 mmol/L 25   Glucose 65 - 100 mg/dL 92     SEROLOGIES:  Not available or performed. Testing was performed today. Serologies Latest Ref Rng & Units 11/23/2020   Hep C Ab 0.0 - 0.9 s/co ratio >11.0 (H)     Serologies Latest Ref Rng & Units 1/27/2021 11/23/2020   Hep A Ab, Total Negative Positive (A)    Hep B Surface Ag Negative Negative    Hep B Core Ab, Total Negative Negative    Hep B Surface AB QL  Non Reactive    Hep C Ab 0.0 - 0.9 s/co ratio  >11.0 (H)   Hep C Genotype  1a    HCV RT-PCR, Quant IU/mL 6,510,000      LIVER HISTOLOGY:  3/2/2021: FibroScan performed at [address]. EkPa was 5.8. IQR/med 10%. . The results suggested a fibrosis level of F0. The CAP score suggest there is hepatic steatosis. ENDOSCOPIC PROCEDURES:  Not available or performed    RADIOLOGY:  1/2021: Liver US: No hepatic mass is identified. Cholelithiasis without evidence to suggest acute cholecystitis. Common bile duct distention and pancreatic duct  distention of uncertain etiology and clinical significance. Significant right hydroureteronephrosis with cortical thinning compatible with long-standing obstruction    OTHER TESTING:  Not available or performed    FOLLOW-UP:  All of the issues listed above in the Assessment and Plan were discussed with the patient. All questions were answered. The patient expressed a clear understanding of the above.     [address] [address] in 6- 8 weeks for routine monitoring    Nikita Kendrick MD, MPH  Advanced Hepatology  [facility] of [address], [address] [facility], [facility] 22.  [phone]  [address] [place] [address]

## 2021-10-16 LAB — HCV RNA SERPL QL NAA+PROBE: NEGATIVE

## 2021-12-01 ENCOUNTER — OFFICE VISIT (OUTPATIENT)
Dept: HEMATOLOGY | Age: 66
End: 2021-12-01
Payer: COMMERCIAL

## 2021-12-01 VITALS
TEMPERATURE: 97.5 F | HEIGHT: 67 IN | SYSTOLIC BLOOD PRESSURE: 122 MMHG | BODY MASS INDEX: 38.77 KG/M2 | WEIGHT: 247 LBS | HEART RATE: 68 BPM | RESPIRATION RATE: 18 BRPM | DIASTOLIC BLOOD PRESSURE: 67 MMHG | OXYGEN SATURATION: 97 %

## 2021-12-01 DIAGNOSIS — B18.2 CHRONIC HEPATITIS C WITHOUT HEPATIC COMA (HCC): Primary | ICD-10-CM

## 2021-12-01 LAB
ALBUMIN SERPL-MCNC: 3.5 G/DL (ref 3.5–5)
ALBUMIN/GLOB SERPL: 0.8 {RATIO} (ref 1.1–2.2)
ALP SERPL-CCNC: 72 U/L (ref 45–117)
ALT SERPL-CCNC: 17 U/L (ref 12–78)
ANION GAP SERPL CALC-SCNC: 8 MMOL/L (ref 5–15)
AST SERPL-CCNC: 8 U/L (ref 15–37)
BASOPHILS # BLD: 0.1 K/UL (ref 0–0.1)
BASOPHILS NFR BLD: 1 % (ref 0–1)
BILIRUB SERPL-MCNC: 0.3 MG/DL (ref 0.2–1)
BUN SERPL-MCNC: 17 MG/DL (ref 6–20)
BUN/CREAT SERPL: 11 (ref 12–20)
CALCIUM SERPL-MCNC: 9.7 MG/DL (ref 8.5–10.1)
CHLORIDE SERPL-SCNC: 106 MMOL/L (ref 97–108)
CO2 SERPL-SCNC: 26 MMOL/L (ref 21–32)
CREAT SERPL-MCNC: 1.5 MG/DL (ref 0.55–1.02)
DIFFERENTIAL METHOD BLD: ABNORMAL
EOSINOPHIL # BLD: 0.2 K/UL (ref 0–0.4)
EOSINOPHIL NFR BLD: 2 % (ref 0–7)
ERYTHROCYTE [DISTWIDTH] IN BLOOD BY AUTOMATED COUNT: 13.6 % (ref 11.5–14.5)
GLOBULIN SER CALC-MCNC: 4.3 G/DL (ref 2–4)
GLUCOSE SERPL-MCNC: 98 MG/DL (ref 65–100)
HCT VFR BLD AUTO: 42.6 % (ref 35–47)
HGB BLD-MCNC: 13.7 G/DL (ref 11.5–16)
IMM GRANULOCYTES # BLD AUTO: 0 K/UL (ref 0–0.04)
IMM GRANULOCYTES NFR BLD AUTO: 0 % (ref 0–0.5)
LYMPHOCYTES # BLD: 3.2 K/UL (ref 0.8–3.5)
LYMPHOCYTES NFR BLD: 27 % (ref 12–49)
MCH RBC QN AUTO: 30.2 PG (ref 26–34)
MCHC RBC AUTO-ENTMCNC: 32.2 G/DL (ref 30–36.5)
MCV RBC AUTO: 93.8 FL (ref 80–99)
MONOCYTES # BLD: 1 K/UL (ref 0–1)
MONOCYTES NFR BLD: 8 % (ref 5–13)
NEUTS SEG # BLD: 7.4 K/UL (ref 1.8–8)
NEUTS SEG NFR BLD: 62 % (ref 32–75)
NRBC # BLD: 0 K/UL (ref 0–0.01)
NRBC BLD-RTO: 0 PER 100 WBC
PLATELET # BLD AUTO: 340 K/UL (ref 150–400)
PMV BLD AUTO: 10.4 FL (ref 8.9–12.9)
POTASSIUM SERPL-SCNC: 4.6 MMOL/L (ref 3.5–5.1)
PROT SERPL-MCNC: 7.8 G/DL (ref 6.4–8.2)
RBC # BLD AUTO: 4.54 M/UL (ref 3.8–5.2)
SODIUM SERPL-SCNC: 140 MMOL/L (ref 136–145)
WBC # BLD AUTO: 11.9 K/UL (ref 3.6–11)

## 2021-12-01 PROCEDURE — 99214 OFFICE O/P EST MOD 30 MIN: CPT | Performed by: HOSPITALIST

## 2021-12-01 RX ORDER — HYDROQUINONE 40 MG/G
CREAM TOPICAL
COMMUNITY
Start: 2021-09-20

## 2021-12-01 NOTE — PROGRESS NOTES
Dinesh Flood [address]      Mili Salmon MD, Leslie Suarez, Olga Sheriff MD, MPH      Mavis Colbert, PA-LUPE Pascual, [facility]     Jes Slade, [facility]   Mariama Plummer [facility]    Itzel Dutta, [facility]       [facility] 136    at [address]    [address] [place] [address], [facility] Kang Salazar  22.    [phone]    FAX: [address] [place] [address], [address]    [phone]    FAX: [phone]     Patient Care Team:  Fay Brown MD as PCP - General (Family Medicine)  Fay Brown MD as PCP - [facility] Empaneled Provider  Rosalie Carrasco MD as Referring Provider (Pulmonary Disease)    Problem List  Date Reviewed: 12/1/2021          Codes Class Noted    Hydronephrosis of right kidney ICD-10-CM: N13.30  ICD-9-CM: 591  3/2/2021        Asthma ICD-10-CM: J45.909  ICD-9-CM: 493.90  Unknown        Severe obesity ([facility] [address] ICD-10-CM: E66.01  ICD-9-CM: 278.01  11/23/2020        Chronic hepatitis C without hepatic coma ([facility] [address] [address] ICD-10-CM: B18.2  ICD-9-CM: 070.54  Magda Salazar returns to the liver institute of [address] [place] [address] for follow up and management of chronic HCV    All medical records sent by the referring physicians were reviewed     The patient is a 77year old Black female who was screened for anti-HCV and tested positive in 1997      Risk factors for acquiring HCV are inhaling cocaine in the 1970's. Patient last inhaled cocaine in the 1970s. There was no history of an episode of acute icteric hepatitis at the time of these risk factors. HCV RNA performed on 1/2021 was 6,510,000.  Hep C genotype 1a    HCV was treated with 12 weeks therapy with Epclusa from 8/2021- 11/2021    Imaging of the liver performed on 1/2021 demonstrated no hepatic mass. There was also significant right hydroureteronephrosis with cortical thinning compatible with long standing obstruction    She underwent right kidney lithotripsy in 04/2021 by Dr Mignon Frazier. FibroScan performed at [address]. This showed EkPa was 5.8. IQR/med 10%. . The results suggested a fibrosis level of F0. The CAP score suggest there is hepatic steatosis. The patient reports she was treated with standard interferon- 2 shots per day for 6 months in 1997. The patient tolerated treatment reasonably well and was told she was cured. However, HCV RNA performed in the clinic showed she has not achieved SVR cure    She was started on Epclusa on last clinic visit    The patient has no symptoms which can be attributed to the liver disorder. The patient is not currently experiencing the following symptoms of liver disease: fatigue, pain in the right side over the liver,   yellowing of the eyes or skin, problems concentrating, swelling of the abdomen, swelling of the lower extremities, hematemesis, hematochezia. The patient completes all daily activities without any functional limitations. Since last clinic visit  She was last seen in the clinic in 08/2021  She has completed 12 weeks therapy with Epclusa- 08/2021- 11/2021   HCV RNA 10/14/2021 was negative      ASSESSMENT AND PLAN:  Chronic HCV   Liver transaminases are normal. ALP is normal. Liver function is normal. The platelet count is normal.      Fibro scan performed showed Ekpa score of 5.8 which correlate with F0 fibrosis  HCV Viral load 6,510,000, genotype 1a  Repeat HCV RNA in 10/2021 was negative  Imaging of the liver showed no focal hepatic lesion. Chronic HCV Treatment  The patient was previously treated for HCV with interferon   The previous treatment response was a null response  She completed 12 week course of Epclusa from 08/2021- 11/2021  We will check HCV RNA today- End of therapy.   We will also check CBC, CMP including hepatic function test  Repeat HCV RNA viral load in 3 months to determine SVR    Screening for Hepatocellular Carcinoma  HCC screening is not necessary if the patient has no evidence of cirrhosis. Substance Use  The patient was counseled regarding the risk of overdose and death from using opioids and other narcotic drugs. Discussed the risk of becoming reinfected with HCV once they are cured if they resume IV drug use or inhaling drugs nasally. The patient has not used drugs since the 1970''s    Vaccinations   The need for vaccination against viral hepatitis A is not needed  Routine vaccinations against other bacterial and viral agents can be performed as indicated. Annual flu vaccination should be administered if indicated. Right hydroureteronephrosis  She is status post right lithotripsy     Tobacco use    ALLERGIES  No Known Allergies    MEDICATIONS  Current Outpatient Medications   Medication Sig    ascorbic acid (VITAMIN C DROPS PO) Take  by mouth.  Cholecalciferol, Vitamin D3, 10 mcg/drop (400 unit/drop) drop Take  by mouth.  B-complex with vitamin C (VITAMIN B COMPLEX-C PO) Take  by mouth.  MILK THISTLE PO Take  by mouth.  TURMERIC PO Take  by mouth.  GARLIC PO Take  by mouth.  CAPSICUM, CAYENNE, PO Take  by mouth.  green tea leaf extract (GREEN TEA PO) Take  by mouth.  hydroquinone (ESOTERICA, MELQUIN) 4 % topical cream APPLY TO DARK AREAS ON FACE EVERY NIGHT AT BEDTIME AS DIRECTED (Patient not taking: Reported on 12/1/2021)    ofloxacin (FLOXIN) 0.3 % ophthalmic solution INSTILL 1 DROP IN RIGHT EYE FOUR TIMES DAILY (Patient not taking: Reported on 12/1/2021)    prednisoLONE acetate (PRED FORTE) 1 % ophthalmic suspension SHAKE LIQUID AND INSTILL 1 DROP IN RIGHT EYE FOUR TIMES DAILY (Patient not taking: Reported on 12/1/2021)     No current facility-administered medications for this visit.        SYSTEM REVIEW NOT RELATED TO LIVER DISEASE OR REVIEWED ABOVE:  Constitution systems: Negative for fever, chills, weight gain, weight loss. Eyes: Negative for visual changes. ENT: Negative for sore throat, painful swallowing. Respiratory: Negative for cough, hemoptysis, SOB. Cardiology: Negative for chest pain, palpitations. GI:  Negative for constipation or diarrhea. : Negative for urinary frequency, dysuria, hematuria, nocturia. Skin: Negative for rash. Hematology: Negative for easy bruising, blood clots. Musculo-skelatal: Negative for back pain, muscle pain, weakness. Neurologic: Negative for headaches, dizziness, vertigo, memory problems not related to HE. Psychology: Negative for anxiety, depression. FAMILY HISTORY:  The patient are   There is no family history of liver disease. The following family members have liver disease: There is no family history of immune disorders. SOCIAL HISTORY:  The patient is . The patient has1 daughter, 3 grandchildren and 2 great grandchildren  The patient currently smokes 1 pack of tobacco daily. The patient drinks alcohol occasionally  The patient currently works full time as a     PHYSICAL EXAMINATION:  Visit Vitals  /67 (BP 1 Location: Right upper arm, BP Patient Position: Sitting, BP Cuff Size: Large adult)   Pulse 68   Temp 97.5 °F (36.4 °C) (Temporal)   Resp 18   Ht 5' 7\" (1.702 m)   Wt 247 lb (112 kg)   SpO2 97%   BMI 38.69 kg/m²     General: No acute distress. Eyes: Sclera anicteric. ENT: No oral lesions. Thyroid normal.  Nodes: No adenopathy. Skin: No spider angiomata. No jaundice. No palmar erythema. Respiratory: Lungs clear to auscultation. Cardiovascular: Regular heart rate. No murmurs. No JVD. Abdomen: Soft non-tender. Liver size normal to percussion/palpation. Spleen not palpable. No obvious ascites. Extremities: No edema. No muscle wasting. No gross arthritic changes. Neurologic: Alert and oriented.   Cranial nerves grossly intact. No asterixis. LABORATORY STUDIES:  Recent liver function panel, CBC with platelet count and BMP are not available. These studies will be performed. Liver [place] of [address] [place] [address] Ref Rng & Units 11/23/2020   WBC 3.6 - 11.0 K/uL 11.1 (H)   HGB 11.5 - 16.0 g/dL 14.4    - 400 K/uL 344   AST 15 - 37 U/L 29   ALT 12 - 78 U/L 48   Alk Phos 45 - 117 U/L 83   Bili, Total 0.2 - 1.0 MG/DL 0.4   Albumin 3.5 - 5.0 g/dL 3.7   BUN 6 - 20 MG/DL 20   Creat 0.55 - 1.02 MG/DL 1.46 (H)   Na 136 - 145 mmol/L 139   K 3.5 - 5.1 mmol/L 4.7   Cl 97 - 108 mmol/L 104   CO2 21 - 32 mmol/L 25   Glucose 65 - 100 mg/dL 92     SEROLOGIES:  Not available or performed. Testing was performed today. Serologies Latest Ref Rng & Units 11/23/2020   Hep C Ab 0.0 - 0.9 s/co ratio >11.0 (H)     Serologies Latest Ref Rng & Units 1/27/2021 11/23/2020   Hep A Ab, Total Negative Positive (A)    Hep B Surface Ag Negative Negative    Hep B Core Ab, Total Negative Negative    Hep B Surface AB QL  Non Reactive    Hep C Ab 0.0 - 0.9 s/co ratio  >11.0 (H)   Hep C Genotype  1a    HCV RT-PCR, Quant IU/mL 6,510,000       Virology Latest Ref Rng & Units 10/14/2021 1/27/2021   HCV RNA, ARLIN, QL Negative   Negative Positive (A)   HCV RNA, ARLIN QT Negative         Virology Latest Ref Rng & Units 11/23/2020   HCV RNA, ARLIN, QL Negative      HCV RNA, ARLIN QT Negative   Positive (A)       LIVER HISTOLOGY:  3/2/2021: [facility]. EkPa was 5.8. IQR/med 10%. . The results suggested a fibrosis level of F0. The CAP score suggest there is hepatic steatosis. ENDOSCOPIC PROCEDURES:  Not available or performed    RADIOLOGY:  1/2021: Liver US: No hepatic mass is identified. Cholelithiasis without evidence to suggest acute cholecystitis. Common bile duct distention and pancreatic duct  distention of uncertain etiology and clinical significance.  Significant right hydroureteronephrosis with cortical thinning compatible with long-standing obstruction    OTHER TESTING:  Not available or performed    FOLLOW-UP:  All of the issues listed above in the Assessment and Plan were discussed with the patient. All questions were answered. The patient expressed a clear understanding of the above.     [address] [place] [address] in 4-5 months for routine monitoring    Faith Vera MD, MPH  Advanced Hepatology  [address] 22.  [phone]  [address] [place] [address]

## 2021-12-01 NOTE — LETTER
12/1/2021    Patient: Jamas Kim   YOB: 1955   Date of Visit: 12/1/2021     Eyad Degroot MD  [address] [facility]    Dear Eyad Degroot MD,      Thank you for referring Ms. Taryn Rose to [address] for evaluation. My notes for this consultation are attached. If you have questions, please do not hesitate to call me. I look forward to following your patient along with you.       Sincerely,    Abeba Carlson MD

## 2021-12-01 NOTE — PROGRESS NOTES
Identified pt with two pt identifiers(name and ). Reviewed record in preparation for visit and have obtained necessary documentation. No chief complaint on file. There were no vitals filed for this visit. Health Maintenance Review: Patient reminded of \"due or due soon\" health maintenance. I have asked the patient to contact his/her primary care provider (PCP) for follow-up on his/her health maintenance. Coordination of Care Questionnaire:  :   1) Have you been to an emergency room, urgent care, or hospitalized since your last visit? If yes, where when, and reason for visit? no       2. Have seen or consulted any other health care provider since your last visit? If yes, where when, and reason for visit? NO      Patient is accompanied by self I have received verbal consent from [address] [place] [address] to discuss any/all medical information while they are present in the room.

## 2021-12-03 LAB — HCV RNA SERPL QL NAA+PROBE: NEGATIVE

## 2022-03-19 PROBLEM — N13.30 HYDRONEPHROSIS OF RIGHT KIDNEY: Status: ACTIVE | Noted: 2021-03-02

## 2022-03-19 PROBLEM — E66.01 SEVERE OBESITY (HCC): Status: ACTIVE | Noted: 2020-11-23

## 2022-03-21 ENCOUNTER — OFFICE VISIT (OUTPATIENT)
Dept: HEMATOLOGY | Age: 67
End: 2022-03-21
Payer: COMMERCIAL

## 2022-03-21 VITALS
WEIGHT: 240 LBS | SYSTOLIC BLOOD PRESSURE: 131 MMHG | OXYGEN SATURATION: 96 % | TEMPERATURE: 97.4 F | HEIGHT: 67 IN | DIASTOLIC BLOOD PRESSURE: 78 MMHG | HEART RATE: 77 BPM | BODY MASS INDEX: 37.67 KG/M2

## 2022-03-21 DIAGNOSIS — B18.2 CHRONIC HEPATITIS C WITHOUT HEPATIC COMA (HCC): Primary | ICD-10-CM

## 2022-03-21 LAB
ALBUMIN SERPL-MCNC: 3.8 G/DL (ref 3.5–5)
ALBUMIN/GLOB SERPL: 1 {RATIO} (ref 1.1–2.2)
ALP SERPL-CCNC: 79 U/L (ref 45–117)
ALT SERPL-CCNC: 17 U/L (ref 12–78)
ANION GAP SERPL CALC-SCNC: 5 MMOL/L (ref 5–15)
AST SERPL-CCNC: 8 U/L (ref 15–37)
BILIRUB DIRECT SERPL-MCNC: <0.1 MG/DL (ref 0–0.2)
BILIRUB SERPL-MCNC: 0.2 MG/DL (ref 0.2–1)
BUN SERPL-MCNC: 18 MG/DL (ref 6–20)
BUN/CREAT SERPL: 12 (ref 12–20)
CALCIUM SERPL-MCNC: 9.9 MG/DL (ref 8.5–10.1)
CHLORIDE SERPL-SCNC: 103 MMOL/L (ref 97–108)
CO2 SERPL-SCNC: 28 MMOL/L (ref 21–32)
CREAT SERPL-MCNC: 1.45 MG/DL (ref 0.55–1.02)
ERYTHROCYTE [DISTWIDTH] IN BLOOD BY AUTOMATED COUNT: 14.5 % (ref 11.5–14.5)
GLOBULIN SER CALC-MCNC: 4 G/DL (ref 2–4)
GLUCOSE SERPL-MCNC: 147 MG/DL (ref 65–100)
HCT VFR BLD AUTO: 41.3 % (ref 35–47)
HGB BLD-MCNC: 13.5 G/DL (ref 11.5–16)
MCH RBC QN AUTO: 30.5 PG (ref 26–34)
MCHC RBC AUTO-ENTMCNC: 32.7 G/DL (ref 30–36.5)
MCV RBC AUTO: 93.4 FL (ref 80–99)
NRBC # BLD: 0.02 K/UL (ref 0–0.01)
NRBC BLD-RTO: 0.2 PER 100 WBC
PLATELET # BLD AUTO: 323 K/UL (ref 150–400)
PMV BLD AUTO: 10.1 FL (ref 8.9–12.9)
POTASSIUM SERPL-SCNC: 4.7 MMOL/L (ref 3.5–5.1)
PROT SERPL-MCNC: 7.8 G/DL (ref 6.4–8.2)
RBC # BLD AUTO: 4.42 M/UL (ref 3.8–5.2)
SODIUM SERPL-SCNC: 136 MMOL/L (ref 136–145)
WBC # BLD AUTO: 11.6 K/UL (ref 3.6–11)

## 2022-03-21 PROCEDURE — 99214 OFFICE O/P EST MOD 30 MIN: CPT | Performed by: PHYSICIAN ASSISTANT

## 2022-03-21 NOTE — PROGRESS NOTES
[address] \[facility]\"", MD, [address], [facility], [place]      Alessia DONALD Moreno, [facility]     Jes MARCOS Berlin, [facility]   DIANE Wright-LUPE Nance, [facility]       Kelvin Gloria [facility] [address]    at [address], [address] Kang Salazar  22.    [phone]    FAX: [address] [place] [address] Avenue    [address], [address]    [phone]    FAX: [phone]       Patient Care Team:  Juan Moses MD as PCP - General (Family Medicine)  Juan Moses MD as PCP - [facility]  Pan Vincent MD as Referring Provider (Pulmonary Disease)    Problem List  Date Reviewed: 12/1/2021          Codes Class Noted    Hydronephrosis of right kidney ICD-10-CM: N13.30  ICD-9-CM: 591  3/2/2021        Asthma ICD-10-CM: J45.909  ICD-9-CM: 493.90  Unknown        Severe obesity ([facility] [address] ICD-10-CM: E66.01  ICD-9-CM: 278.01  11/23/2020        Chronic hepatitis C without hepatic coma ([facility] [address] ICD-10-CM: B18.2  ICD-9-CM: 070.54  Alissa Del Rio returns to the [facility] [place] for follow up and management of chronic HCV. The patient is a 77year old Black female who was screened for anti-HCV and tested positive in 1997. Risk factors for acquiring HCV are inhaling cocaine in the 1970's. The patient reports she was treated with standard interferon- 2 shots per day for 6 months in 1997. The patient tolerated treatment reasonably well and was told she was cured. However, HCV RNA performed in the clinic at her initial visit demonstrated persistent virus. HCV RNA performed on 1/2021 was 6,510,000. Hep C genotype 1a    Imaging of the liver performed on 1/2021 demonstrated no hepatic mass.  There was also significant right hydroureteronephrosis with cortical thinning compatible with long standing obstruction. She underwent right kidney lithotripsy in 04/2021 by Dr Geoff Moy. She has had no additional abdominal pain or stone disease. FibroScan performed at [address]. This showed EkPa was 5.8. IQR/med 10%. . The results suggested a fibrosis level of F0. The CAP score suggest there is hepatic steatosis. HCV was treated with 12 weeks therapy with Epclusa from 8/2021- 11/2021. She now present ~4 months post-treatment for determination of SVR. She has no residual side effects of therapy. The patient has no symptoms which can be attributed to the liver disorder. The patient is not currently experiencing the following symptoms of liver disease: fatigue, pain in the right side over the liver, yellowing of the eyes or skin, problems concentrating, swelling of the abdomen, swelling of the lower extremities, hematemesis, hematochezia. The patient completes all daily activities without any functional limitations. ASSESSMENT AND PLAN:  Chronic HCV   Liver transaminases are normal. ALP is normal. Liver function is normal. The platelet count is normal.      Fibro scan performed showed Ekpa score of 5.8 which correlate with F0 fibrosis    The patient was previously treated for HCV with interferon   The previous treatment response was a null response  Prior to retreatment, her HCV Viral load 6,510,000, genotype 1a    She completed 12 week course of Epclusa from 08/2021- 11/2021  Repeat HCV RNA in 12/2021 at the end of treatment was negative. She is now ~4 months post-treatment and we will determine if she has achieved a sustained response. Steatosis  Initial Fibroscan had demonstrated increased steatosis. We will plan on repeat Fibroscan at the time of her next office visit and determine what her long-term follow-up will be.   I have asked her to focus on ~20-24# weight gain over the course of the next 6 months. Screening for Hepatocellular Carcinoma  HCC screening is not necessary if the patient has no evidence of cirrhosis. Prior imaging of the liver showed no focal hepatic lesion. Substance Use  The patient was counseled regarding the risk of overdose and death from using opioids and other narcotic drugs. Discussed the risk of becoming reinfected with HCV once they are cured if they resume IV drug use or inhaling drugs nasally. The patient has not used drugs since the 1970''s    Vaccinations   The need for vaccination against viral hepatitis A is not needed  Routine vaccinations against other bacterial and viral agents can be performed as indicated. Annual flu vaccination should be administered if indicated. Right hydroureteronephrosis  She is status post right lithotripsy     ALLERGIES  No Known Allergies    MEDICATIONS  Current Outpatient Medications   Medication Sig    hydroquinone (ESOTERICA, MELQUIN) 4 % topical cream APPLY TO DARK AREAS ON FACE EVERY NIGHT AT BEDTIME AS DIRECTED (Patient not taking: Reported on 12/1/2021)    ofloxacin (FLOXIN) 0.3 % ophthalmic solution INSTILL 1 DROP IN RIGHT EYE FOUR TIMES DAILY (Patient not taking: Reported on 12/1/2021)    prednisoLONE acetate (PRED FORTE) 1 % ophthalmic suspension SHAKE LIQUID AND INSTILL 1 DROP IN RIGHT EYE FOUR TIMES DAILY (Patient not taking: Reported on 12/1/2021)    ascorbic acid (VITAMIN C DROPS PO) Take  by mouth. (Patient not taking: Reported on 3/21/2022)    Cholecalciferol, Vitamin D3, 10 mcg/drop (400 unit/drop) drop Take  by mouth. (Patient not taking: Reported on 3/21/2022)    B-complex with vitamin C (VITAMIN B COMPLEX-C PO) Take  by mouth. (Patient not taking: Reported on 3/21/2022)    MILK THISTLE PO Take  by mouth. (Patient not taking: Reported on 3/21/2022)    TURMERIC PO Take  by mouth. (Patient not taking: Reported on 8/52/0118)    GARLIC PO Take  by mouth.  (Patient not taking: Reported on 3/21/2022)  CAPSICUM, CAYENNE, PO Take  by mouth. (Patient not taking: Reported on 3/21/2022)    green tea leaf extract (GREEN TEA PO) Take  by mouth. (Patient not taking: Reported on 3/21/2022)     No current facility-administered medications for this visit. SYSTEM REVIEW NOT RELATED TO LIVER DISEASE OR REVIEWED ABOVE:  Constitution systems: Negative for fever, chills, weight gain, weight loss. Eyes: Negative for visual changes. ENT: Negative for sore throat, painful swallowing. Respiratory: Negative for cough, hemoptysis, SOB. Cardiology: Negative for chest pain, palpitations. GI:  Negative for constipation or diarrhea. : Negative for urinary frequency, dysuria, hematuria, nocturia. Skin: Negative for rash. Hematology: Negative for easy bruising, blood clots. Musculo-skeletal: Negative for back pain, muscle pain, weakness. Neurologic: Negative for headaches, dizziness, vertigo, memory problems not related to HE. Psychology: Negative for anxiety, depression. FAMILY HISTORY:  The patient are   There is no family history of liver disease. The following family members have liver disease: There is no family history of immune disorders. SOCIAL HISTORY:  The patient is . The patient has1 daughter, 3 grandchildren and 2 great grandchildren  The patient currently smokes 1 pack of tobacco daily. The patient drinks alcohol occasionally  The patient currently works full time as a     PHYSICAL EXAMINATION:  Visit Vitals  /78 (BP 1 Location: Right arm, BP Patient Position: Sitting, BP Cuff Size: Adult)   Pulse 77   Temp 97.4 °F (36.3 °C) (Temporal)   Ht 5' 7\" (1.702 m)   Wt 240 lb (108.9 kg)   SpO2 96%   BMI 37.59 kg/m²     General: No acute distress. Eyes: Sclera anicteric. ENT: No oral lesions. Thyroid normal.  Nodes: No adenopathy. Skin: No spider angiomata. No jaundice. No palmar erythema. Respiratory: Lungs clear to auscultation. Cardiovascular: Regular heart rate. No murmurs. No JVD. Abdomen: Soft non-tender. Liver size normal to percussion/palpation. Spleen not palpable. No obvious ascites. Extremities: No edema. No muscle wasting. No gross arthritic changes. Neurologic: Alert and oriented. Cranial nerves grossly intact. No asterixis. LABORATORY STUDIES:  Liver [place] [address] Units 12/1/2021 11/23/2020   WBC 3.6 - 11.0 K/uL 11.9 (H) 11.1 (H)   ANC 1.8 - 8.0 K/UL 7.4    HGB 11.5 - 16.0 g/dL 13.7 14.4    - 400 K/uL 340 344   AST 15 - 37 U/L 8 (L) 29   ALT 12 - 78 U/L 17 48   Alk Phos 45 - 117 U/L 72 83   Bili, Total 0.2 - 1.0 MG/DL 0.3 0.4   Albumin 3.5 - 5.0 g/dL 3.5 3.7   BUN 6 - 20 MG/DL 17 20   Creat 0.55 - 1.02 MG/DL 1.50 (H) 1.46 (H)   Na 136 - 145 mmol/L 140 139   K 3.5 - 5.1 mmol/L 4.6 4.7   Cl 97 - 108 mmol/L 106 104   CO2 21 - 32 mmol/L 26 25   Glucose 65 - 100 mg/dL 98 92     Virology Latest Ref Rng & Units 12/1/2021 10/14/2021 1/27/2021   HCV RNA, ARLIN, QL Negative   Negative Negative Positive (A)   HCV RNA, ARLIN QT Negative        Additional lab values drawn at today's office visit are pending at the time of documentation. SEROLOGIES:    Serologies Latest Ref Rng & Units 1/27/2021 11/23/2020   Hep A Ab, Total Negative Positive (A)    Hep B Surface Ag Negative Negative    Hep B Core Ab, Total Negative Negative    Hep B Surface AB QL  Non Reactive    Hep C Ab 0.0 - 0.9 s/co ratio  >11.0 (H)   Hep C Genotype  1a    HCV RT-PCR, Quant IU/mL 6,510,000        LIVER HISTOLOGY:  3/2/2021: [facility]. EkPa was 5.8. IQR/med 10%. . The results suggested a fibrosis level of F0. The CAP score suggest there is hepatic steatosis. ENDOSCOPIC PROCEDURES:  Not available or performed    RADIOLOGY:  1/2021: Liver US: No hepatic mass is identified. Cholelithiasis without evidence to suggest acute cholecystitis.  Common bile duct distention and pancreatic duct distention of uncertain etiology and clinical significance. Significant right hydroureteronephrosis with cortical thinning compatible with long-standing obstruction    OTHER TESTING:  Not available or performed    FOLLOW-UP:  All of the issues listed above in the Assessment and Plan were discussed with the patient. All questions were answered. The patient expressed a clear understanding of the above. [facility] [address] in 6 months for repeat Fibroscan, lab testing and follow-up. Documentation reviewed and updated to reflect current, accurate patient information.     Jan Gottron, PA-C  [facility] 59, [address] [facility] 22.  [phone]  [address] [place] [address]

## 2022-03-21 NOTE — PROGRESS NOTES
Identified pt with two pt identifiers(name and ). Reviewed record in preparation for visit and have obtained necessary documentation. Chief Complaint   Patient presents with    Hepatitis C     4months f/u with Norma Nagel      Vitals:    22 1520   BP: 131/78   Pulse: 77   Temp: 97.4 °F (36.3 °C)   TempSrc: Temporal   SpO2: 96%   Weight: 240 lb (108.9 kg)   Height: 5' 7\" (1.702 m)   PainSc:   0 - No pain       Health Maintenance Review: Patient reminded of \"due or due soon\" health maintenance. I have asked the patient to contact his/her primary care provider (PCP) for follow-up on his/her health maintenance. Coordination of Care Questionnaire:  :   1) Have you been to an emergency room, urgent care, or hospitalized since your last visit? If yes, where when, and reason for visit? no       2. Have seen or consulted any other health care provider since your last visit? If yes, where when, and reason for visit? NO      Patient is accompanied by self I have received verbal consent from [address] [place] [address] to discuss any/all medical information while they are present in the room.

## 2022-03-24 ENCOUNTER — TRANSCRIBE ORDER (OUTPATIENT)
Dept: SCHEDULING | Age: 67
End: 2022-03-24

## 2022-03-24 DIAGNOSIS — Z12.31 SCREENING MAMMOGRAM FOR HIGH-RISK PATIENT: Primary | ICD-10-CM

## 2022-03-30 ENCOUNTER — HOSPITAL ENCOUNTER (OUTPATIENT)
Dept: MAMMOGRAPHY | Age: 67
Discharge: HOME | End: 2022-03-30
Attending: FAMILY MEDICINE
Payer: COMMERCIAL

## 2022-03-30 DIAGNOSIS — Z12.31 SCREENING MAMMOGRAM FOR HIGH-RISK PATIENT: ICD-10-CM

## 2022-03-30 LAB — HCV RNA SERPL QL NAA+PROBE: NEGATIVE

## 2022-03-30 PROCEDURE — 77067 SCR MAMMO BI INCL CAD: CPT

## 2022-03-30 NOTE — PROGRESS NOTES
Pt notified that 4 mo post-treatment testing indicates neg HCV RNA or sustained response to therapy. Follow-up in 6 months for repeat FS and likely discharge from practice.

## 2022-09-22 ENCOUNTER — OFFICE VISIT (OUTPATIENT)
Dept: HEMATOLOGY | Age: 67
End: 2022-09-22
Payer: COMMERCIAL

## 2022-09-22 VITALS
SYSTOLIC BLOOD PRESSURE: 125 MMHG | HEART RATE: 66 BPM | OXYGEN SATURATION: 96 % | WEIGHT: 242 LBS | BODY MASS INDEX: 37.98 KG/M2 | HEIGHT: 67 IN | DIASTOLIC BLOOD PRESSURE: 68 MMHG | TEMPERATURE: 98.1 F

## 2022-09-22 DIAGNOSIS — B18.2 CHRONIC HEPATITIS C WITHOUT HEPATIC COMA (HCC): Primary | ICD-10-CM

## 2022-09-22 LAB
ALBUMIN SERPL-MCNC: 3.6 G/DL (ref 3.5–5)
ALBUMIN/GLOB SERPL: 0.9 {RATIO} (ref 1.1–2.2)
ALP SERPL-CCNC: 83 U/L (ref 45–117)
ALT SERPL-CCNC: 17 U/L (ref 12–78)
ANION GAP SERPL CALC-SCNC: 4 MMOL/L (ref 5–15)
AST SERPL-CCNC: 7 U/L (ref 15–37)
BILIRUB DIRECT SERPL-MCNC: <0.1 MG/DL (ref 0–0.2)
BILIRUB SERPL-MCNC: 0.3 MG/DL (ref 0.2–1)
BUN SERPL-MCNC: 15 MG/DL (ref 6–20)
BUN/CREAT SERPL: 11 (ref 12–20)
CALCIUM SERPL-MCNC: 9.1 MG/DL (ref 8.5–10.1)
CHLORIDE SERPL-SCNC: 108 MMOL/L (ref 97–108)
CO2 SERPL-SCNC: 28 MMOL/L (ref 21–32)
CREAT SERPL-MCNC: 1.37 MG/DL (ref 0.55–1.02)
ERYTHROCYTE [DISTWIDTH] IN BLOOD BY AUTOMATED COUNT: 13.5 % (ref 11.5–14.5)
GLOBULIN SER CALC-MCNC: 4 G/DL (ref 2–4)
GLUCOSE SERPL-MCNC: 115 MG/DL (ref 65–100)
HCT VFR BLD AUTO: 42.6 % (ref 35–47)
HGB BLD-MCNC: 14 G/DL (ref 11.5–16)
MCH RBC QN AUTO: 30.8 PG (ref 26–34)
MCHC RBC AUTO-ENTMCNC: 32.9 G/DL (ref 30–36.5)
MCV RBC AUTO: 93.8 FL (ref 80–99)
NRBC # BLD: 0 K/UL (ref 0–0.01)
NRBC BLD-RTO: 0 PER 100 WBC
PLATELET # BLD AUTO: 325 K/UL (ref 150–400)
PMV BLD AUTO: 10.5 FL (ref 8.9–12.9)
POTASSIUM SERPL-SCNC: 4.4 MMOL/L (ref 3.5–5.1)
PROT SERPL-MCNC: 7.6 G/DL (ref 6.4–8.2)
RBC # BLD AUTO: 4.54 M/UL (ref 3.8–5.2)
SODIUM SERPL-SCNC: 140 MMOL/L (ref 136–145)
WBC # BLD AUTO: 10.8 K/UL (ref 3.6–11)

## 2022-09-22 PROCEDURE — 91200 LIVER ELASTOGRAPHY: CPT | Performed by: PHYSICIAN ASSISTANT

## 2022-09-22 PROCEDURE — 99214 OFFICE O/P EST MOD 30 MIN: CPT | Performed by: PHYSICIAN ASSISTANT

## 2022-09-22 PROCEDURE — 1123F ACP DISCUSS/DSCN MKR DOCD: CPT | Performed by: PHYSICIAN ASSISTANT

## 2022-09-22 NOTE — PROGRESS NOTES
Identified pt with two pt identifiers(name and ). Reviewed record in preparation for visit and have obtained necessary documentation. Chief Complaint   Patient presents with    Hepatitis C     FS 6month follow up with Radha Ndiaye:    22 1335   BP: 125/68   Pulse: 66   Temp: 98.1 °F (36.7 °C)   TempSrc: Temporal   SpO2: 96%   Weight: 242 lb (109.8 kg)   Height: 5' 7\" (1.702 m)   PainSc:   0 - No pain       Health Maintenance Review: Patient reminded of \"due or due soon\" health maintenance. I have asked the patient to contact his/her primary care provider (PCP) for follow-up on his/her health maintenance. Coordination of Care Questionnaire:  :   1) Have you been to an emergency room, urgent care, or hospitalized since your last visit? If yes, where when, and reason for visit? no       2. Have seen or consulted any other health care provider since your last visit? If yes, where when, and reason for visit? NO      Patient is accompanied by self I have received verbal consent from [address] [place] [address] to discuss any/all medical information while they are present in the room.

## 2022-09-22 NOTE — PROGRESS NOTES
[address] [facility], [place], [place], [facility] [place], [place]      DONALD Massey, [facility]     Jes Slade, [facility]   Izora Hashimoto, P-LUPE Domingo, [facility]       Kelvin Deputado [address]    at [address], [facility] Kang Salazar  22.    [phone]    FAX: [address] [place] [address]    at [address], [address]    [phone]    FAX: [phone]     Patient Care Team:  Danny Almonte MD as PCP - General (Family Medicine)  Danny Almonte MD as PCP - [facility]  Bairon Covarrubias MD as Referring Provider (Pulmonary Disease)    Problem List  Date Reviewed: 3/21/2022            Codes Class Noted    Hydronephrosis of right kidney ICD-10-CM: N13.30  ICD-9-CM: 591  3/2/2021        Asthma ICD-10-CM: J45.909  ICD-9-CM: 493.90  Unknown        Severe obesity ([facility] ICD-10-CM: E66.01  ICD-9-CM: 278.01  11/23/2020        Chronic hepatitis C without hepatic coma ([facility] ICD-10-CM: B18.2  ICD-9-CM: 070.54  Jaylene Stewart [facility] for follow up and management of chronic HCV. The patient is a 77year old Black female who was screened for anti-HCV and tested positive in 1997. Risk factors for acquiring HCV are inhaling cocaine in the 1970's. The patient reports she was treated with standard interferon- 2 shots per day for 6 months in 1997. The patient tolerated treatment reasonably well and was told she was cured. However, HCV RNA performed in the clinic at her initial visit demonstrated persistent virus. HCV RNA performed on 1/2021 was 6,510,000. Hep C genotype 1a    Imaging of the liver performed on 1/2021 demonstrated no hepatic mass.  There was also significant right hydroureteronephrosis with cortical thinning compatible with long standing obstruction. She underwent right kidney lithotripsy in 04/2021 by Dr Rachael Hamilton. She has had no additional abdominal pain or stone disease. [facility] This showed EkPa was 5.8. IQR/med 10%. . The results suggested a fibrosis level of F0. The CAP score suggest there is hepatic steatosis. HCV was treated with 12 weeks therapy with Epclusa from 8/2021- 11/2021. She was seen to have achieved SVR in 3/2022 and now presents for repeat Fibroscan. The patient has no symptoms which can be attributed to the liver disorder. The patient is not currently experiencing the following symptoms of liver disease: fatigue, pain in the right side over the liver, yellowing of the eyes or skin, problems concentrating, swelling of the abdomen, swelling of the lower extremities, hematemesis, hematochezia. The patient completes all daily activities without any functional limitations. We had encouraged her to try to lose weight in the last year and she is working on this, but slowly. ASSESSMENT AND PLAN:  Chronic HCV   Liver transaminases are normal. ALP is normal. Liver function is normal. The platelet count is normal.      Fibro scan performed showed Ekpa score of 5.8 which correlate with F0 fibrosis    The patient was previously treated for HCV with interferon   The previous treatment response was a null response  Prior to retreatment, her HCV Viral load 6,510,000, genotype 1a    She completed 12 week course of Epclusa from 08/2021- 11/2021  Repeat HCV RNA in 12/2021 at the end of treatment was negative. She is now ~12 months post-treatment and we will confirm if she has achieved a sustained response. Steatosis  Initial Fibroscan had demonstrated increased steatosis.   We will plan on repeat Fibroscan at the time of her next office visit and determine what her long-term follow-up will be.  I have asked her to focus on ~20-24# weight gain over the course of the next 12 months. Screening for Hepatocellular Carcinoma  HCC screening is not necessary if the patient has no evidence of cirrhosis. Prior imaging of the liver showed no focal hepatic lesion. Substance Use  The patient was counseled regarding the risk of overdose and death from using opioids and other narcotic drugs. Discussed the risk of becoming reinfected with HCV once they are cured if they resume IV drug use or inhaling drugs nasally. The patient has not used drugs since the 1970''s    Vaccinations   The need for vaccination against viral hepatitis A is not needed  Routine vaccinations against other bacterial and viral agents can be performed as indicated. Annual flu vaccination should be administered if indicated. Right hydroureteronephrosis  She is status post right lithotripsy     ALLERGIES  No Known Allergies    MEDICATIONS  Current Outpatient Medications   Medication Sig    hydroquinone (ESOTERICA, MELQUIN) 4 % topical cream APPLY TO DARK AREAS ON FACE EVERY NIGHT AT BEDTIME AS DIRECTED (Patient not taking: No sig reported)    ofloxacin (FLOXIN) 0.3 % ophthalmic solution INSTILL 1 DROP IN RIGHT EYE FOUR TIMES DAILY (Patient not taking: No sig reported)    prednisoLONE acetate (PRED FORTE) 1 % ophthalmic suspension SHAKE LIQUID AND INSTILL 1 DROP IN RIGHT EYE FOUR TIMES DAILY (Patient not taking: No sig reported)    ascorbic acid (VITAMIN C DROPS PO) Take  by mouth. (Patient not taking: No sig reported)    Cholecalciferol, Vitamin D3, 10 mcg/drop (400 unit/drop) drop Take  by mouth. (Patient not taking: No sig reported)    B-complex with vitamin C (VITAMIN B COMPLEX-C PO) Take  by mouth. (Patient not taking: No sig reported)    MILK THISTLE PO Take  by mouth. (Patient not taking: No sig reported)    TURMERIC PO Take  by mouth. (Patient not taking: No sig reported)    GARLIC PO Take  by mouth.  (Patient not taking: No sig reported)    CAPSICUM, CAYENNE, PO Take  by mouth. (Patient not taking: No sig reported)    green tea leaf extract (GREEN TEA PO) Take  by mouth. (Patient not taking: No sig reported)     No current facility-administered medications for this visit. SYSTEM REVIEW NOT RELATED TO LIVER DISEASE OR REVIEWED ABOVE:  Constitution systems: Negative for fever, chills, weight gain, weight loss. Eyes: Negative for visual changes. ENT: Negative for sore throat, painful swallowing. Respiratory: Negative for cough, hemoptysis, SOB. Cardiology: Negative for chest pain, palpitations. GI:  Negative for constipation or diarrhea. : Negative for urinary frequency, dysuria, hematuria, nocturia. Skin: Negative for rash. Hematology: Negative for easy bruising, blood clots. Musculo-skeletal: Negative for back pain, muscle pain, weakness. Neurologic: Negative for headaches, dizziness, vertigo, memory problems not related to HE. Psychology: Negative for anxiety, depression. FAMILY HISTORY:  The patient are   There is no family history of liver disease. The following family members have liver disease: There is no family history of immune disorders. SOCIAL HISTORY:  The patient is . The patient has1 daughter, 3 grandchildren and 2 great grandchildren  The patient currently smokes 1 pack of tobacco daily. The patient drinks alcohol occasionally  The patient currently works full time as a     PHYSICAL EXAMINATION:  Visit Vitals  /68 (BP 1 Location: Right arm, BP Patient Position: Sitting, BP Cuff Size: Adult long)   Pulse 66   Temp 98.1 °F (36.7 °C) (Temporal)   Ht 5' 7\" (1.702 m)   Wt 242 lb (109.8 kg)   SpO2 96%   BMI 37.90 kg/m²     General: No acute distress. Eyes: Sclera anicteric. ENT: No oral lesions. Thyroid normal.  Nodes: No adenopathy. Skin: No spider angiomata. No jaundice. No palmar erythema. Respiratory: Lungs clear to auscultation. Cardiovascular: Regular heart rate. No murmurs. No JVD. Abdomen: Soft non-tender. Liver size normal to percussion/palpation. Spleen not palpable. No obvious ascites. Extremities: No edema. No muscle wasting. No gross arthritic changes. Neurologic: Alert and oriented. Cranial nerves grossly intact. No asterixis.     LABORATORY STUDIES:      [address] & Units 3/21/2022 12/1/2021   WBC 3.6 - 11.0 K/uL 11.6 (H) 11.9 (H)   ANC 1.8 - 8.0 K/UL  7.4   HGB 11.5 - 16.0 g/dL 13.5 13.7    - 400 K/uL 323 340   AST 15 - 37 U/L 8 (L) 8 (L)   ALT 12 - 78 U/L 17 17   Alk Phos 45 - 117 U/L 79 72   Bili, Total 0.2 - 1.0 MG/DL 0.2 0.3   Bili, Direct 0.0 - 0.2 MG/DL <0.1    Albumin 3.5 - 5.0 g/dL 3.8 3.5   BUN 6 - 20 MG/DL 18 17   Creat 0.55 - 1.02 MG/DL 1.45 (H) 1.50 (H)   Na 136 - 145 mmol/L 136 140   K 3.5 - 5.1 mmol/L 4.7 4.6   Cl 97 - 108 mmol/L 103 106   CO2 21 - 32 mmol/L 28 26   Glucose 65 - 100 mg/dL 147 (H) 98     Liver [facility] Latest Ref Rng & Units 11/23/2020   WBC 3.6 - 11.0 K/uL 11.1 (H)   ANC 1.8 - 8.0 K/UL    HGB 11.5 - 16.0 g/dL 14.4    - 400 K/uL 344   AST 15 - 37 U/L 29   ALT 12 - 78 U/L 48   Alk Phos 45 - 117 U/L 83   Bili, Total 0.2 - 1.0 MG/DL 0.4   Bili, Direct 0.0 - 0.2 MG/DL    Albumin 3.5 - 5.0 g/dL 3.7   BUN 6 - 20 MG/DL 20   Creat 0.55 - 1.02 MG/DL 1.46 (H)   Na 136 - 145 mmol/L 139   K 3.5 - 5.1 mmol/L 4.7   Cl 97 - 108 mmol/L 104   CO2 21 - 32 mmol/L 25   Glucose 65 - 100 mg/dL 92     Liver [facility] Latest Ref Rng & Units 3/21/2022 12/1/2021   WBC 3.6 - 11.0 K/uL 11.6 (H) 11.9 (H)   ANC 1.8 - 8.0 K/UL  7.4   HGB 11.5 - 16.0 g/dL 13.5 13.7    - 400 K/uL 323 340   AST 15 - 37 U/L 8 (L) 8 (L)   ALT 12 - 78 U/L 17 17   Alk Phos 45 - 117 U/L 79 72   Bili, Total 0.2 - 1.0 MG/DL 0.2 0.3   Bili, Direct 0.0 - 0.2 MG/DL <0.1    Albumin 3.5 - 5.0 g/dL 3.8 3.5   BUN 6 - 20 MG/DL 18 17   Creat 0.55 - 1.02 MG/DL 1.45 (H) 1.50 (H) Na 136 - 145 mmol/L 136 140   K 3.5 - 5.1 mmol/L 4.7 4.6   Cl 97 - 108 mmol/L 103 106   CO2 21 - 32 mmol/L 28 26   Glucose 65 - 100 mg/dL 147 (H) 98     Liver [place] [address] Ref Rng & Units 11/23/2020   WBC 3.6 - 11.0 K/uL 11.1 (H)   ANC 1.8 - 8.0 K/UL    HGB 11.5 - 16.0 g/dL 14.4    - 400 K/uL 344   AST 15 - 37 U/L 29   ALT 12 - 78 U/L 48   Alk Phos 45 - 117 U/L 83   Bili, Total 0.2 - 1.0 MG/DL 0.4   Bili, Direct 0.0 - 0.2 MG/DL    Albumin 3.5 - 5.0 g/dL 3.7   BUN 6 - 20 MG/DL 20   Creat 0.55 - 1.02 MG/DL 1.46 (H)   Na 136 - 145 mmol/L 139   K 3.5 - 5.1 mmol/L 4.7   Cl 97 - 108 mmol/L 104   CO2 21 - 32 mmol/L 25   Glucose 65 - 100 mg/dL 92     Virology Latest Ref Rng & Units 3/21/2022 12/1/2021 10/14/2021   HCV RNA, ARLIN, QL Negative   Negative Negative Negative   HCV RNA, ARLIN QT Negative        Additional lab values drawn at today's office visit are pending at the time of documentation. SEROLOGIES:  Serologies Latest Ref Rng & Units 1/27/2021 11/23/2020   Hep A Ab, Total Negative Positive (A)    Hep B Surface Ag Negative Negative    Hep B Core Ab, Total Negative Negative    Hep B Surface AB QL  Non Reactive    Hep C Ab 0.0 - 0.9 s/co ratio  >11.0 (H)   Hep C Genotype  1a    HCV RT-PCR, Quant IU/mL 6,510,000        LIVER HISTOLOGY:  3/2/2021: FibroScan performed at [address]. EkPa was 5.8. IQR/med 10%. . The results suggested a fibrosis level of F0. The CAP score suggest there is hepatic steatosis. 9/2022. FibroScan performed at [address]. EkPa was 4.6. Suggested fibrosis level is F0-1. CAP score 318 is consistent with steatosis. ENDOSCOPIC PROCEDURES:  Not available or performed    RADIOLOGY:  1/2021: Liver US: No hepatic mass is identified. Cholelithiasis without evidence to suggest acute cholecystitis. Common bile duct distention and pancreatic duct distention of uncertain etiology and clinical significance.  Significant right hydroureteronephrosis with cortical thinning compatible with long-standing obstruction    OTHER TESTING:  Not available or performed    FOLLOW-UP:  All of the issues listed above in the Assessment and Plan were discussed with the patient. All questions were answered. The patient expressed a clear understanding of the above. [facility] [place] [address] in 12 months for repeat Fibroscan, lab testing and follow-up. Documentation reviewed and updated to reflect current, accurate patient information.     Katiuska Mcmullen PA-C  Liver [facility], [facility] [address] Norris  22.  [phone]  [facility] [address]

## 2022-09-25 LAB
HCV RNA SERPL NAA+PROBE-ACNC: NORMAL IU/ML
TEST INFORMATION: NORMAL

## 2022-09-27 NOTE — PROGRESS NOTES
Pt notified via [facility] letter of stable liver functions/enzymes and continued negative HCV RNA. Follow-up as planned in 12 months with repeat Fibroscan. Will forward continued mild elevation of creatinine to PCP.

## 2022-11-25 ENCOUNTER — TRANSCRIBE ORDER (OUTPATIENT)
Dept: SCHEDULING | Age: 67
End: 2022-11-25

## 2022-11-25 DIAGNOSIS — R91.8 MULTIPLE PULMONARY NODULES: Primary | ICD-10-CM

## 2022-12-07 ENCOUNTER — HOSPITAL ENCOUNTER (OUTPATIENT)
Dept: CT IMAGING | Age: 67
Discharge: HOME | End: 2022-12-07
Attending: INTERNAL MEDICINE

## 2022-12-07 DIAGNOSIS — R91.8 MULTIPLE PULMONARY NODULES: ICD-10-CM

## 2022-12-07 PROCEDURE — 71250 CT THORAX DX C-: CPT

## 2023-02-03 ENCOUNTER — OFFICE VISIT (OUTPATIENT)
Dept: FAMILY MEDICINE CLINIC | Age: 68
End: 2023-02-03
Payer: COMMERCIAL

## 2023-02-03 VITALS
TEMPERATURE: 97.5 F | OXYGEN SATURATION: 97 % | SYSTOLIC BLOOD PRESSURE: 133 MMHG | WEIGHT: 253 LBS | RESPIRATION RATE: 16 BRPM | HEART RATE: 68 BPM | HEIGHT: 67 IN | BODY MASS INDEX: 39.71 KG/M2 | DIASTOLIC BLOOD PRESSURE: 80 MMHG

## 2023-02-03 DIAGNOSIS — Z13.220 SCREENING FOR LIPID DISORDERS: ICD-10-CM

## 2023-02-03 DIAGNOSIS — Z78.9 NOT IMMUNE TO HEPATITIS B VIRUS: ICD-10-CM

## 2023-02-03 DIAGNOSIS — Z01.818 PRE-OP EXAMINATION: Primary | ICD-10-CM

## 2023-02-03 DIAGNOSIS — Z13.1 SCREENING FOR DIABETES MELLITUS: ICD-10-CM

## 2023-02-03 PROBLEM — G47.33 OSA ON CPAP: Status: ACTIVE | Noted: 2023-02-03

## 2023-02-03 PROBLEM — N20.0 NEPHROLITHIASIS: Status: ACTIVE | Noted: 2023-02-03

## 2023-02-03 PROBLEM — Z99.89 OSA ON CPAP: Status: ACTIVE | Noted: 2023-02-03

## 2023-02-03 NOTE — PROGRESS NOTES
Chief Complaint   Patient presents with    Pre-op Exam     Dental implant         1. \"Have you been to the ER, urgent care clinic since your last visit? Hospitalized since your last visit? \" No    2. \"Have you seen or consulted any other health care providers outside of the [address] [place] [address] since your last visit? \" No     3. For patients aged 39-70: Has the patient had a colonoscopy / FIT/ Cologuard? No      If the patient is female:    4. For patients aged 41-77: Has the patient had a mammogram within the past 2 years? Yes - no Care Gap present      5. For patients aged 21-65: Has the patient had a pap smear?  NA - based on age or sex       Financial Resource Strain: Low Risk     Difficulty of Paying Living Expenses: Not hard at all      Food Insecurity: No Food Insecurity    Worried About Running Out of Food in the Last Year: Never true    [address] in the Last Year: Never true        3 most recent PHQ Screens 2/3/2023   Little interest or pleasure in doing things Not at all   Feeling down, depressed, irritable, or hopeless Not at all   Total Score PHQ 2 0       Health Maintenance Due   Topic Date Due    COVID-19 Vaccine (1) Never done    Pneumococcal 65+ years (1 - PCV) Never done    Hepatitis B Vaccine (1 of 3 - Risk 3-dose series) Never done    DTaP/Tdap/Td series (1 - Tdap) Never done    Shingles Vaccine (1 of 2) Never done    Low dose CT lung screening  Never done    Flu Vaccine (1) Never done

## 2023-02-03 NOTE — PROGRESS NOTES
Patient Name: Herb Johnson   MRN: [id number]    Joe Carson is a 69 y.o. female who presents with the following:     Pre Op  Procedure: dental implant  Expected Date: 2/21/23  Surgeon: [address] (not sure which provider)  Hx of complications with general anesthesia: none  Signs and symptoms of cardiovascular disease:  none  Have any of the following: CVA, CHF, Cr > 2.0, insulin-dependent DM, ischemic cardiac disease, or suprainguinal vascular/intrathroacic/intraabdominal surgery:  none  Able to meet > 4 METS: yes  STOP-BANG (snoring, tiredness, observed apnea, high BP, BMI>35, age >50, neck circumference> 15 in, male): 3+ risk factors - hx of SUBHA on CPAP. Hx of hepatitis C, followed by hepatology. Prior Hepatitis B Ab was non immune; immune to Hepatitis A. Review of Systems   Constitutional:  Negative for fever, malaise/fatigue and weight loss. Respiratory:  Negative for cough, hemoptysis, shortness of breath and wheezing. Cardiovascular:  Negative for chest pain, palpitations, leg swelling and PND. Gastrointestinal:  Negative for abdominal pain, constipation, diarrhea, nausea and vomiting. The patient's medications, allergies, past medical history, surgical history, family history and social history were reviewed and updated where appropriate. No current outpatient medications on file.     No Known Allergies      Past Medical History:   Diagnosis Date    Asthma     Chronic hepatitis C without hepatic coma ([address] 1997    Hydronephrosis of right kidney 03/02/2021    Nephrolithiasis     SUBHA on CPAP 02/03/2023    Severe obesity ([address] 11/23/2020       Past Surgical History:   Procedure Laterality Date    HX BREAST BIOPSY Bilateral 1997    Benign    HX CATARACT REMOVAL         Family History   Problem Relation Age of Onset    Diabetes Mother     Diabetes Father        Social History     Tobacco Use    Smoking status: Every Day     Packs/day: 1.00 Years: 20.00     Pack years: 20.00     Types: Cigarettes    Smokeless tobacco: Never   Vaping Use    Vaping Use: Never used   Substance Use Topics    Alcohol use: Yes     Comment: occasional    Drug use: Not Currently     Types: Cocaine, Marijuana     Comment: 30 years ago           OBJECTIVE    Visit Vitals  /80 (BP Patient Position: Sitting)   Pulse 68   Temp 97.5 °F (36.4 °C) (Temporal)   Resp 16   Ht 5' 7\" (1.702 m)   Wt 253 lb (114.8 kg)   SpO2 97%   BMI 39.63 kg/m²       Physical Exam  Vitals and nursing note reviewed. Constitutional:       General: She is not in acute distress. Appearance: She is not diaphoretic. Eyes:      Conjunctiva/sclera: Conjunctivae normal.      Pupils: Pupils are equal, round, and reactive to light. Cardiovascular:      Rate and Rhythm: Normal rate and regular rhythm. Pulses:           Carotid pulses are 2+ on the right side and 2+ on the left side. Heart sounds: Normal heart sounds. No murmur heard. No friction rub. No gallop. Pulmonary:      Effort: Pulmonary effort is normal. No respiratory distress. Breath sounds: Normal breath sounds. No wheezing. Skin:     General: Skin is warm and dry. Neurological:      Mental Status: She is alert. ASSESSMENT AND PLAN  Talib Lawson is a 69 y.o. female who presents today for:    1. Pre-op examination  Risk of cardiac death and nonfatal myocardial infarction for noncardiac surgical procedures:  low risk procedure (<1%). Revised Cardiac Risk Index of a major cardiac event is 0.4%. Patient has no clinical predictor of perioperative cardiac complications. These risk calculators have been reviewed with the patient who expressed understanding of the relative cardiac risk of his/her upcoming procedure given his/her current risk factors.    According to the [employer] of Cardiology and AHA Guidelines of perioperative cardiovascular evaluation for noncardiac surgery, patient may proceed with surgery with no additional cardiac testing or procedure;  ASA Physical Status Classification is II (no clinical predictors, low risk procedure, > 4 METS). EKG NSR.  - AMB POC EKG ROUTINE W/ 12 LEADS, INTER & REP  - HEMOGLOBIN A1C WITH EAG; Future  - CBC W/O DIFF; Future  - METABOLIC PANEL, COMPREHENSIVE; Future  - LIPID PANEL; Future  - LIPID PANEL  - METABOLIC PANEL, COMPREHENSIVE  - CBC W/O DIFF  - HEMOGLOBIN A1C WITH EAG    2. Screening for diabetes mellitus  - HEMOGLOBIN A1C WITH EAG; Future  - HEMOGLOBIN A1C WITH EAG    3. Screening for lipid disorders  - CBC W/O DIFF; Future  - METABOLIC PANEL, COMPREHENSIVE; Future  - LIPID PANEL; Future  - LIPID PANEL  - METABOLIC PANEL, COMPREHENSIVE  - CBC W/O DIFF    4. Not immune to hepatitis B virus  Will check antibody and if not immune, will set up nurse visits for vaccination.  - HEP B SURFACE AB; Future  - HEP B SURFACE AB      Medications Discontinued During This Encounter   Medication Reason    TURMERIC PO LIST CLEANUP    prednisoLONE acetate (PRED FORTE) 1 % ophthalmic suspension LIST CLEANUP    green tea leaf extract (GREEN TEA PO) LIST CLEANUP    Cholecalciferol, Vitamin D3, 10 mcg/drop (400 unit/drop) drop LIST CLEANUP    green tea leaf extract (GREEN TEA PO) LIST CLEANUP    GARLIC PO LIST CLEANUP    ascorbic acid (VITAMIN C DROPS PO) LIST CLEANUP    B-complex with vitamin C (VITAMIN B COMPLEX-C PO) LIST CLEANUP    CAPSICUM, CAYENNE, PO LIST CLEANUP    hydroquinone (ESOTERICA, MELQUIN) 4 % topical cream LIST CLEANUP    MILK THISTLE PO LIST CLEANUP    ofloxacin (FLOXIN) 0.3 % ophthalmic solution LIST CLEANUP           Treatment risks/benefits/costs/interactions/alternatives discussed with patient. Advised patient to call back or return to office if symptoms worsen/change/persist. If patient cannot reach us or should anything more severe/urgent arise he/she should proceed directly to the nearest emergency department.   Discussed expected course/resolution/complications of diagnosis in detail with patient. Patient expressed understanding with the diagnosis and plan. This dictation may have been completed with Dragon, the computer voice recognition software. Unanticipated grammatical, syntax, homophones, and other interpretive errors are sometimes inadvertently transcribed by the computer software. Please disregard any errors that have escaped final proofreading. Andrew Bonilla M.D.

## 2023-02-04 LAB
ALBUMIN SERPL-MCNC: 3.6 G/DL (ref 3.5–5)
ALBUMIN/GLOB SERPL: 1 (ref 1.1–2.2)
ALP SERPL-CCNC: 76 U/L (ref 45–117)
ALT SERPL-CCNC: 14 U/L (ref 12–78)
ANION GAP SERPL CALC-SCNC: 3 MMOL/L (ref 5–15)
AST SERPL-CCNC: <3 U/L (ref 15–37)
BILIRUB SERPL-MCNC: 0.3 MG/DL (ref 0.2–1)
BUN SERPL-MCNC: 12 MG/DL (ref 6–20)
BUN/CREAT SERPL: 8 (ref 12–20)
CALCIUM SERPL-MCNC: 9.2 MG/DL (ref 8.5–10.1)
CHLORIDE SERPL-SCNC: 107 MMOL/L (ref 97–108)
CHOLEST SERPL-MCNC: 193 MG/DL
CO2 SERPL-SCNC: 28 MMOL/L (ref 21–32)
CREAT SERPL-MCNC: 1.48 MG/DL (ref 0.55–1.02)
ERYTHROCYTE [DISTWIDTH] IN BLOOD BY AUTOMATED COUNT: 14.2 % (ref 11.5–14.5)
EST. AVERAGE GLUCOSE BLD GHB EST-MCNC: 140 MG/DL
GLOBULIN SER CALC-MCNC: 3.6 G/DL (ref 2–4)
GLUCOSE SERPL-MCNC: 81 MG/DL (ref 65–100)
HBA1C MFR BLD: 6.5 % (ref 4–5.6)
HBV SURFACE AB SER QL: NONREACTIVE
HBV SURFACE AB SER-ACNC: <3.1 MIU/ML
HCT VFR BLD AUTO: 43.3 % (ref 35–47)
HDLC SERPL-MCNC: 36 MG/DL
HDLC SERPL: 5.4 (ref 0–5)
HGB BLD-MCNC: 13.3 G/DL (ref 11.5–16)
LDLC SERPL CALC-MCNC: 125.2 MG/DL (ref 0–100)
MCH RBC QN AUTO: 29.1 PG (ref 26–34)
MCHC RBC AUTO-ENTMCNC: 30.7 G/DL (ref 30–36.5)
MCV RBC AUTO: 94.7 FL (ref 80–99)
NRBC # BLD: 0 K/UL (ref 0–0.01)
NRBC BLD-RTO: 0 PER 100 WBC
PLATELET # BLD AUTO: 359 K/UL (ref 150–400)
PMV BLD AUTO: 10.1 FL (ref 8.9–12.9)
POTASSIUM SERPL-SCNC: 5.4 MMOL/L (ref 3.5–5.1)
PROT SERPL-MCNC: 7.2 G/DL (ref 6.4–8.2)
RBC # BLD AUTO: 4.57 M/UL (ref 3.8–5.2)
SODIUM SERPL-SCNC: 138 MMOL/L (ref 136–145)
TRIGL SERPL-MCNC: 159 MG/DL (ref ?–150)
VLDLC SERPL CALC-MCNC: 31.8 MG/DL
WBC # BLD AUTO: 10.1 K/UL (ref 3.6–11)

## 2023-02-06 DIAGNOSIS — E11.9 TYPE 2 DIABETES MELLITUS WITHOUT COMPLICATION, WITHOUT LONG-TERM CURRENT USE OF INSULIN (HCC): Primary | ICD-10-CM

## 2023-02-06 RX ORDER — INSULIN PUMP SYRINGE, 3 ML
EACH MISCELLANEOUS
Qty: 1 KIT | Refills: 0 | Status: SHIPPED | OUTPATIENT
Start: 2023-02-06

## 2023-02-06 RX ORDER — LANCETS
EACH MISCELLANEOUS
Qty: 100 EACH | Refills: 2 | Status: SHIPPED | OUTPATIENT
Start: 2023-02-06

## 2023-02-06 RX ORDER — IBUPROFEN 200 MG
CAPSULE ORAL
Qty: 100 STRIP | Refills: 2 | Status: SHIPPED | OUTPATIENT
Start: 2023-02-06

## 2023-02-24 ENCOUNTER — CLINICAL SUPPORT (OUTPATIENT)
Dept: FAMILY MEDICINE CLINIC | Age: 68
End: 2023-02-24
Payer: COMMERCIAL

## 2023-02-24 DIAGNOSIS — Z23 ENCOUNTER FOR IMMUNIZATION: Primary | ICD-10-CM

## 2023-02-24 NOTE — PROGRESS NOTES
Errol Alvarez is a 69 y.o. female  who presents for HEPB1 immunizations. she denies any symptoms , reactions or allergies that would exclude them from being immunized today. Risks and adverse reactions were discussed and the VIS was given to them. All questions were addressed. she was observed for 10 min post injection. There were no reactions observed.     LOT: [id number]  NDC: 65529-010-42  EXP DATE: 12/07/23  Tommie Cooper

## 2023-03-24 ENCOUNTER — CLINICAL SUPPORT (OUTPATIENT)
Dept: FAMILY MEDICINE CLINIC | Age: 68
End: 2023-03-24

## 2023-03-24 DIAGNOSIS — Z23 ENCOUNTER FOR IMMUNIZATION: Primary | ICD-10-CM

## 2023-03-24 NOTE — PROGRESS NOTES
Carrie Young is a 69 y.o. female  who presents for 2nd Hepatitis B immunizations. she denies any symptoms , reactions or allergies that would exclude them from being immunized today. Risks and adverse reactions were discussed and the VIS was given to them. All questions were addressed. she was observed for 10 min post injection. There were no reactions observed.     [facility]:[id number]  [address]: [id number]  EXP DATE: 11/29/24  Wilbert Ly

## 2023-05-08 ENCOUNTER — TRANSCRIBE ORDERS (OUTPATIENT)
Facility: HOSPITAL | Age: 68
End: 2023-05-08

## 2023-05-08 DIAGNOSIS — Z12.31 BREAST CANCER SCREENING BY MAMMOGRAM: Primary | ICD-10-CM

## 2023-06-30 SDOH — ECONOMIC STABILITY: FOOD INSECURITY: WITHIN THE PAST 12 MONTHS, YOU WORRIED THAT YOUR FOOD WOULD RUN OUT BEFORE YOU GOT MONEY TO BUY MORE.: NEVER TRUE

## 2023-06-30 SDOH — ECONOMIC STABILITY: INCOME INSECURITY: HOW HARD IS IT FOR YOU TO PAY FOR THE VERY BASICS LIKE FOOD, HOUSING, MEDICAL CARE, AND HEATING?: NOT HARD AT ALL

## 2023-06-30 SDOH — ECONOMIC STABILITY: TRANSPORTATION INSECURITY
IN THE PAST 12 MONTHS, HAS LACK OF TRANSPORTATION KEPT YOU FROM MEETINGS, WORK, OR FROM GETTING THINGS NEEDED FOR DAILY LIVING?: NO

## 2023-06-30 SDOH — ECONOMIC STABILITY: FOOD INSECURITY: WITHIN THE PAST 12 MONTHS, THE FOOD YOU BOUGHT JUST DIDN'T LAST AND YOU DIDN'T HAVE MONEY TO GET MORE.: NEVER TRUE

## 2023-06-30 SDOH — ECONOMIC STABILITY: HOUSING INSECURITY
IN THE LAST 12 MONTHS, WAS THERE A TIME WHEN YOU DID NOT HAVE A STEADY PLACE TO SLEEP OR SLEPT IN A SHELTER (INCLUDING NOW)?: NO

## 2023-07-03 ENCOUNTER — OFFICE VISIT (OUTPATIENT)
Age: 68
End: 2023-07-03
Payer: COMMERCIAL

## 2023-07-03 VITALS
OXYGEN SATURATION: 98 % | WEIGHT: 254.4 LBS | HEART RATE: 63 BPM | BODY MASS INDEX: 39.93 KG/M2 | TEMPERATURE: 97.3 F | SYSTOLIC BLOOD PRESSURE: 118 MMHG | DIASTOLIC BLOOD PRESSURE: 66 MMHG | RESPIRATION RATE: 16 BRPM | HEIGHT: 67 IN

## 2023-07-03 DIAGNOSIS — E78.5 HYPERLIPIDEMIA, UNSPECIFIED HYPERLIPIDEMIA TYPE: ICD-10-CM

## 2023-07-03 DIAGNOSIS — E11.9 TYPE 2 DIABETES MELLITUS WITHOUT COMPLICATION, WITHOUT LONG-TERM CURRENT USE OF INSULIN (HCC): ICD-10-CM

## 2023-07-03 DIAGNOSIS — R07.81 RIB PAIN: Primary | ICD-10-CM

## 2023-07-03 PROCEDURE — 3044F HG A1C LEVEL LT 7.0%: CPT | Performed by: FAMILY MEDICINE

## 2023-07-03 PROCEDURE — 99214 OFFICE O/P EST MOD 30 MIN: CPT | Performed by: FAMILY MEDICINE

## 2023-07-03 PROCEDURE — 1123F ACP DISCUSS/DSCN MKR DOCD: CPT | Performed by: FAMILY MEDICINE

## 2023-07-03 ASSESSMENT — PATIENT HEALTH QUESTIONNAIRE - PHQ9
1. LITTLE INTEREST OR PLEASURE IN DOING THINGS: 0
SUM OF ALL RESPONSES TO PHQ QUESTIONS 1-9: 0
SUM OF ALL RESPONSES TO PHQ9 QUESTIONS 1 & 2: 0
2. FEELING DOWN, DEPRESSED OR HOPELESS: 0
SUM OF ALL RESPONSES TO PHQ QUESTIONS 1-9: 0

## 2023-07-03 ASSESSMENT — ENCOUNTER SYMPTOMS
DIARRHEA: 0
NAUSEA: 0
ABDOMINAL PAIN: 0
SHORTNESS OF BREATH: 0
WHEEZING: 0
COUGH: 0
VOMITING: 0
CHEST TIGHTNESS: 0
CONSTIPATION: 0

## 2023-07-03 NOTE — PROGRESS NOTES
Patient Name: Ashok Bustamante   MRN: [id number]    Allison Uribe is a 77 y.o. female who presents with the following:     Reports ongoing bilateral pain along the side of her ribs on both sides, along her bra line. States it seems to bother her with certain movements such as if she twists to the side. 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 It is hard for her to find comfortable bras. Denies any recent trauma or injury to the area. Last labs over for high blood pressure and A1c of 6.5. She does exercise regularly and tries to eat healthy. States its been hard for her to lose weight. The 10-year ASCVD risk score (Torito AGARWAL, et al., 2019) is: 17.9%    Values used to calculate the score:      Age: 77 years      Sex: Female      Is Non- : Yes      Diabetic: No      Tobacco smoker: Yes      Systolic Blood Pressure: 175 mmHg      Is BP treated: No      HDL Cholesterol: 36 MG/DL      Total Cholesterol: 193 MG/DL    Wt Readings from Last 3 Encounters:   07/03/23 254 lb 6.4 oz (115.4 kg)   02/03/23 253 lb (114.8 kg)   09/22/22 242 lb (109.8 kg)       Review of Systems   Constitutional:  Negative for activity change, appetite change, fatigue, fever and unexpected weight change. Respiratory:  Negative for cough, chest tightness, shortness of breath and wheezing. Cardiovascular:  Negative for chest pain, palpitations and leg swelling. Gastrointestinal:  Negative for abdominal pain, constipation, diarrhea, nausea and vomiting. Genitourinary:  Negative for dysuria, frequency and urgency. Skin:  Negative for rash. Neurological:  Negative for dizziness, weakness and headaches. Psychiatric/Behavioral:  Negative for dysphoric mood and suicidal ideas. The patient is not nervous/anxious. All other systems reviewed and are negative.       The patient's medications, allergies, past medical history, surgical history, family history and

## 2023-07-03 NOTE — PROGRESS NOTES
Chief Complaint   Patient presents with    Chest Pain     Left rib pain         1. \"Have you been to the ER, urgent care clinic since your last visit? Hospitalized since your last visit? \" no    2. \"Have you seen or consulted any other health care providers outside of the [address] [place] [address] since your last visit? \" no    3. For patients aged 43-73: Has the patient had a colonoscopy / FIT/ Cologuard? Yes      If the patient is female:    4. For patients aged 43-66: Has the patient had a mammogram within the past 2 years? no      5. For patients aged 21-65: Has the patient had a pap smear?  N/A    PHQ-9  7/3/2023   Little interest or pleasure in doing things 0   Little interest or pleasure in doing things -   Feeling down, depressed, or hopeless 0   PHQ-2 Score 0   Total Score PHQ 2 -   PHQ-9 Total Score 0           Financial Resource Strain: Low Risk     Difficulty of Paying Living Expenses: Not hard at all      Food Insecurity: No Food Insecurity    Worried About Running Out of Food in the Last Year: Never true    Ran Out of Food in the Last Year: Never true          Health Maintenance Due   Topic Date Due    COVID-19 Vaccine (1) Never done    Pneumococcal 65+ years Vaccine (1 - PCV) Never done    DTaP/Tdap/Td vaccine (1 - Tdap) Never done    Shingles vaccine (1 of 2) Never done    Low dose CT lung screening &/or counseling  Never done    A1C test (Diabetic or Prediabetic)  05/03/2023

## 2023-07-04 LAB
ALBUMIN SERPL-MCNC: 3.5 G/DL (ref 3.5–5)
ALBUMIN/GLOB SERPL: 0.9 (ref 1.1–2.2)
ALP SERPL-CCNC: 77 U/L (ref 45–117)
ALT SERPL-CCNC: 15 U/L (ref 12–78)
ANION GAP SERPL CALC-SCNC: 3 MMOL/L (ref 5–15)
AST SERPL-CCNC: 11 U/L (ref 15–37)
BILIRUB SERPL-MCNC: 0.3 MG/DL (ref 0.2–1)
BUN SERPL-MCNC: 13 MG/DL (ref 6–20)
BUN/CREAT SERPL: 10 (ref 12–20)
CALCIUM SERPL-MCNC: 9.5 MG/DL (ref 8.5–10.1)
CHLORIDE SERPL-SCNC: 106 MMOL/L (ref 97–108)
CHOLEST SERPL-MCNC: 193 MG/DL
CO2 SERPL-SCNC: 27 MMOL/L (ref 21–32)
CREAT SERPL-MCNC: 1.36 MG/DL (ref 0.55–1.02)
CREAT UR-MCNC: 52 MG/DL
ERYTHROCYTE [DISTWIDTH] IN BLOOD BY AUTOMATED COUNT: 14 % (ref 11.5–14.5)
EST. AVERAGE GLUCOSE BLD GHB EST-MCNC: 146 MG/DL
GLOBULIN SER CALC-MCNC: 3.9 G/DL (ref 2–4)
GLUCOSE SERPL-MCNC: 149 MG/DL (ref 65–100)
HBA1C MFR BLD: 6.7 % (ref 4–5.6)
HCT VFR BLD AUTO: 41.2 % (ref 35–47)
HDLC SERPL-MCNC: 30 MG/DL
HDLC SERPL: 6.4 (ref 0–5)
HGB BLD-MCNC: 12.6 G/DL (ref 11.5–16)
LDLC SERPL CALC-MCNC: 123.8 MG/DL (ref 0–100)
MCH RBC QN AUTO: 28.8 PG (ref 26–34)
MCHC RBC AUTO-ENTMCNC: 30.6 G/DL (ref 30–36.5)
MCV RBC AUTO: 94.1 FL (ref 80–99)
MICROALBUMIN UR-MCNC: 0.82 MG/DL
MICROALBUMIN/CREAT UR-RTO: 16 MG/G (ref 0–30)
NRBC # BLD: 0 K/UL (ref 0–0.01)
NRBC BLD-RTO: 0 PER 100 WBC
PLATELET # BLD AUTO: 349 K/UL (ref 150–400)
PMV BLD AUTO: 10.4 FL (ref 8.9–12.9)
POTASSIUM SERPL-SCNC: 4.5 MMOL/L (ref 3.5–5.1)
PROT SERPL-MCNC: 7.4 G/DL (ref 6.4–8.2)
RBC # BLD AUTO: 4.38 M/UL (ref 3.8–5.2)
SODIUM SERPL-SCNC: 136 MMOL/L (ref 136–145)
TRIGL SERPL-MCNC: 196 MG/DL
TSH SERPL DL<=0.05 MIU/L-ACNC: 0.86 UIU/ML (ref 0.36–3.74)
VLDLC SERPL CALC-MCNC: 39.2 MG/DL
WBC # BLD AUTO: 10.8 K/UL (ref 3.6–11)

## 2023-07-05 RX ORDER — ATORVASTATIN CALCIUM 10 MG/1
10 TABLET, FILM COATED ORAL DAILY
Qty: 30 TABLET | Refills: 2 | Status: SHIPPED | OUTPATIENT
Start: 2023-07-05

## 2023-07-06 ENCOUNTER — TELEPHONE (OUTPATIENT)
Age: 68
End: 2023-07-06

## 2023-07-06 NOTE — TELEPHONE ENCOUNTER
Patient called hoping to get a call back regarding her lab results.     Best call back number  [phone]

## 2023-09-28 ENCOUNTER — OFFICE VISIT (OUTPATIENT)
Age: 68
End: 2023-09-28
Payer: COMMERCIAL

## 2023-09-28 VITALS
HEIGHT: 67 IN | DIASTOLIC BLOOD PRESSURE: 67 MMHG | WEIGHT: 252.4 LBS | OXYGEN SATURATION: 97 % | HEART RATE: 61 BPM | SYSTOLIC BLOOD PRESSURE: 115 MMHG | BODY MASS INDEX: 39.62 KG/M2 | TEMPERATURE: 98.2 F

## 2023-09-28 DIAGNOSIS — B18.2 CHRONIC HEPATITIS C WITHOUT HEPATIC COMA (HCC): Primary | ICD-10-CM

## 2023-09-28 PROCEDURE — 99213 OFFICE O/P EST LOW 20 MIN: CPT | Performed by: PHYSICIAN ASSISTANT

## 2023-09-28 PROCEDURE — 1123F ACP DISCUSS/DSCN MKR DOCD: CPT | Performed by: PHYSICIAN ASSISTANT

## 2023-09-28 PROCEDURE — 91200 LIVER ELASTOGRAPHY: CPT | Performed by: PHYSICIAN ASSISTANT

## 2023-09-28 ASSESSMENT — PATIENT HEALTH QUESTIONNAIRE - PHQ9
1. LITTLE INTEREST OR PLEASURE IN DOING THINGS: 0
2. FEELING DOWN, DEPRESSED OR HOPELESS: 0
SUM OF ALL RESPONSES TO PHQ QUESTIONS 1-9: 0
SUM OF ALL RESPONSES TO PHQ9 QUESTIONS 1 & 2: 0
SUM OF ALL RESPONSES TO PHQ QUESTIONS 1-9: 0

## 2023-09-28 NOTE — PROGRESS NOTES
MD Elaine, FACP, [place], [place]      PHUONG Hyatt, [facility]   Chasity Perdomo, [facility]-   Lorenzo Sagastume, ISHAN Raymundo FNSIMONE-CALLI Varma, [facility]   Ramo Latoya, [facility]      [address]   at [facility]   [address] [facility], [address] [facility], [facility]   [phone]   FAX: [phone]  [facility] [place] [facility]   at [facility], [address] [facility], [address]   [phone]   FAX: [phone]     Patient Care Team:  Reymundo Hayes MD as PCP - Katherine Agustin MD as PCP - Empaneled Provider  Lani Uriarte MD as Referring Physician    Patient Active Problem List   Diagnosis    Chronic hepatitis C without hepatic coma ([address])    Severe obesity ([address])    Hydronephrosis of right kidney    Asthma    LOGAN on CPAP    Nephrolithiasis       Radha Concepcion returns to the [address] [place] [address],[address] [facility] for follow up and management of chronic HCV. The patient is a 77 y.o. Black female who was screened for anti-HCV and tested positive in 1997. Risk factors for acquiring HCV are inhaling cocaine in the 1970's. The patient reports she was treated with standard interferon- 2 shots per day for 6 months in 1997. The patient tolerated treatment reasonably well and was told she was cured. However, HCV RNA performed in the clinic at her initial visit demonstrated persistent virus. HCV RNA performed on 1/2021 was 6,510,000. Hep C genotype 1a    Imaging of the liver performed on 1/2021 demonstrated no hepatic mass. There was also significant right hydroureteronephrosis with cortical thinning compatible with long standing obstruction. She underwent right kidney lithotripsy in 04/2021 by Dr Demario Pendleton. She has had no additional abdominal pain or stone disease.     FibroScan performed at

## 2024-02-06 ENCOUNTER — TELEPHONE (OUTPATIENT)
Age: 69
End: 2024-02-06

## 2024-02-06 DIAGNOSIS — Z12.11 SCREENING FOR COLON CANCER: Primary | ICD-10-CM

## 2024-02-06 NOTE — TELEPHONE ENCOUNTER
----- Message from Chantelle Colin sent at 2/6/2024  3:28 PM EST -----  Subject: Referral Request    Reason for referral request? patient want the clolguard testing supplies   sent to her home. Address is current.  Provider patient wants to be referred to(if known):     Provider Phone Number(if known):    Additional Information for Provider?   ---------------------------------------------------------------------------  --------------  CALL BACK INFO    [phone]; OK to leave message on voicemail  ---------------------------------------------------------------------------  --------------

## 2024-02-06 NOTE — TELEPHONE ENCOUNTER
Called Patient. Name and  confirmed.  Informed her that dr. Fernandez ordered test. Verbalized understanding.    Detailed VM left for Dari.

## 2024-03-04 LAB — NONINV COLON CA DNA+OCC BLD SCRN STL QL: NEGATIVE

## 2024-08-16 ENCOUNTER — TRANSCRIBE ORDERS (OUTPATIENT)
Facility: HOSPITAL | Age: 69
End: 2024-08-16

## 2024-08-16 DIAGNOSIS — Z12.31 VISIT FOR SCREENING MAMMOGRAM: Primary | ICD-10-CM

## 2024-08-19 ENCOUNTER — OFFICE VISIT (OUTPATIENT)
Age: 69
End: 2024-08-19
Payer: COMMERCIAL

## 2024-08-19 VITALS
TEMPERATURE: 97.5 F | SYSTOLIC BLOOD PRESSURE: 128 MMHG | WEIGHT: 250 LBS | HEART RATE: 79 BPM | RESPIRATION RATE: 16 BRPM | OXYGEN SATURATION: 96 % | DIASTOLIC BLOOD PRESSURE: 70 MMHG | BODY MASS INDEX: 39.24 KG/M2 | HEIGHT: 67 IN

## 2024-08-19 DIAGNOSIS — E11.9 TYPE 2 DIABETES MELLITUS WITHOUT COMPLICATION, WITHOUT LONG-TERM CURRENT USE OF INSULIN (HCC): ICD-10-CM

## 2024-08-19 DIAGNOSIS — D17.0 LIPOMA OF FACE: ICD-10-CM

## 2024-08-19 DIAGNOSIS — R93.2 ABNORMAL GALLBLADDER ULTRASOUND: ICD-10-CM

## 2024-08-19 DIAGNOSIS — Z00.00 ROUTINE GENERAL MEDICAL EXAMINATION AT HEALTH CARE FACILITY: Primary | ICD-10-CM

## 2024-08-19 DIAGNOSIS — E78.5 HYPERLIPIDEMIA, UNSPECIFIED HYPERLIPIDEMIA TYPE: ICD-10-CM

## 2024-08-19 LAB
ALBUMIN SERPL-MCNC: 3.6 G/DL (ref 3.5–5)
ALBUMIN/GLOB SERPL: 0.9 (ref 1.1–2.2)
ALP SERPL-CCNC: 90 U/L (ref 45–117)
ALT SERPL-CCNC: 14 U/L (ref 12–78)
ANION GAP SERPL CALC-SCNC: 7 MMOL/L (ref 5–15)
AST SERPL-CCNC: 10 U/L (ref 15–37)
BILIRUB SERPL-MCNC: 0.3 MG/DL (ref 0.2–1)
BUN SERPL-MCNC: 13 MG/DL (ref 6–20)
BUN/CREAT SERPL: 10 (ref 12–20)
CALCIUM SERPL-MCNC: 9.7 MG/DL (ref 8.5–10.1)
CHLORIDE SERPL-SCNC: 103 MMOL/L (ref 97–108)
CHOLEST SERPL-MCNC: 198 MG/DL
CO2 SERPL-SCNC: 27 MMOL/L (ref 21–32)
CREAT SERPL-MCNC: 1.28 MG/DL (ref 0.55–1.02)
CREAT UR-MCNC: 42.3 MG/DL
ERYTHROCYTE [DISTWIDTH] IN BLOOD BY AUTOMATED COUNT: 14 % (ref 11.5–14.5)
EST. AVERAGE GLUCOSE BLD GHB EST-MCNC: 148 MG/DL
GLOBULIN SER CALC-MCNC: 4.1 G/DL (ref 2–4)
GLUCOSE SERPL-MCNC: 104 MG/DL (ref 65–100)
HBA1C MFR BLD: 6.8 % (ref 4–5.6)
HCT VFR BLD AUTO: 42.9 % (ref 35–47)
HDLC SERPL-MCNC: 34 MG/DL
HDLC SERPL: 5.8 (ref 0–5)
HGB BLD-MCNC: 13.2 G/DL (ref 11.5–16)
LDLC SERPL CALC-MCNC: 126 MG/DL (ref 0–100)
MCH RBC QN AUTO: 29.5 PG (ref 26–34)
MCHC RBC AUTO-ENTMCNC: 30.8 G/DL (ref 30–36.5)
MCV RBC AUTO: 95.8 FL (ref 80–99)
MICROALBUMIN UR-MCNC: 1.63 MG/DL
MICROALBUMIN/CREAT UR-RTO: 39 MG/G (ref 0–30)
NRBC # BLD: 0 K/UL (ref 0–0.01)
NRBC BLD-RTO: 0 PER 100 WBC
PLATELET # BLD AUTO: 372 K/UL (ref 150–400)
PMV BLD AUTO: 10.1 FL (ref 8.9–12.9)
POTASSIUM SERPL-SCNC: 4.2 MMOL/L (ref 3.5–5.1)
PROT SERPL-MCNC: 7.7 G/DL (ref 6.4–8.2)
RBC # BLD AUTO: 4.48 M/UL (ref 3.8–5.2)
SODIUM SERPL-SCNC: 137 MMOL/L (ref 136–145)
TRIGL SERPL-MCNC: 190 MG/DL
VLDLC SERPL CALC-MCNC: 38 MG/DL
WBC # BLD AUTO: 11.7 K/UL (ref 3.6–11)

## 2024-08-19 PROCEDURE — 99213 OFFICE O/P EST LOW 20 MIN: CPT | Performed by: FAMILY MEDICINE

## 2024-08-19 PROCEDURE — 99397 PER PM REEVAL EST PAT 65+ YR: CPT | Performed by: FAMILY MEDICINE

## 2024-08-19 SDOH — ECONOMIC STABILITY: FOOD INSECURITY: WITHIN THE PAST 12 MONTHS, YOU WORRIED THAT YOUR FOOD WOULD RUN OUT BEFORE YOU GOT MONEY TO BUY MORE.: NEVER TRUE

## 2024-08-19 SDOH — ECONOMIC STABILITY: FOOD INSECURITY: WITHIN THE PAST 12 MONTHS, THE FOOD YOU BOUGHT JUST DIDN'T LAST AND YOU DIDN'T HAVE MONEY TO GET MORE.: NEVER TRUE

## 2024-08-19 SDOH — ECONOMIC STABILITY: INCOME INSECURITY: HOW HARD IS IT FOR YOU TO PAY FOR THE VERY BASICS LIKE FOOD, HOUSING, MEDICAL CARE, AND HEATING?: NOT HARD AT ALL

## 2024-08-19 ASSESSMENT — ENCOUNTER SYMPTOMS
CONSTIPATION: 0
CHEST TIGHTNESS: 0
VOMITING: 0
WHEEZING: 0
COUGH: 0
SHORTNESS OF BREATH: 0
NAUSEA: 0
ABDOMINAL PAIN: 0
DIARRHEA: 0

## 2024-08-19 ASSESSMENT — PATIENT HEALTH QUESTIONNAIRE - PHQ9
SUM OF ALL RESPONSES TO PHQ QUESTIONS 1-9: 0
SUM OF ALL RESPONSES TO PHQ9 QUESTIONS 1 & 2: 0
SUM OF ALL RESPONSES TO PHQ QUESTIONS 1-9: 0
1. LITTLE INTEREST OR PLEASURE IN DOING THINGS: NOT AT ALL
2. FEELING DOWN, DEPRESSED OR HOPELESS: NOT AT ALL

## 2024-08-19 NOTE — PROGRESS NOTES
Chief Complaint   Patient presents with    Mass     On head ; about 3 years     Weight Management    Annual Exam     \"Have you been to the ER, urgent care clinic since your last visit?  Hospitalized since your last visit?\"    NO    “Have you seen or consulted any other health care providers outside of [facility] since your last visit?”    NO     Financial Resource Strain: Low Risk  (8/19/2024)    Overall Financial Resource Strain (CARDIA)     Difficulty of Paying Living Expenses: Not hard at all      Food Insecurity: No Food Insecurity (8/19/2024)    Hunger Vital Sign     Worried About Running Out of Food in the Last Year: Never true     Ran Out of Food in the Last Year: Never true            8/19/2024     1:52 PM   PHQ-9    Little interest or pleasure in doing things 0   Feeling down, depressed, or hopeless 0   PHQ-2 Score 0   PHQ-9 Total Score 0       Health Maintenance Due   Topic Date Due    Pneumococcal 65+ years Vaccine (1 of 2 - PCV) Never done    DTaP/Tdap/Td vaccine (1 - Tdap) Never done    Shingles vaccine (1 of 2) Never done    Respiratory Syncytial Virus (RSV) Pregnant or age 60 yrs+ (1 - 1-dose 60+ series) Never done    Hepatitis B vaccine (3 of 3 - Risk 3-dose series) 08/24/2023    COVID-19 Vaccine (1 - 2023-24 season) Never done    Lung Cancer Screening &/or Counseling  12/07/2023    Breast cancer screen  03/30/2024    A1C test (Diabetic or Prediabetic)  07/03/2024    Lipids  07/03/2024    GFR test (Diabetes, CKD 3-4, OR last GFR 15-59)  07/03/2024    Flu vaccine (1) Never done             
weakness and headaches.   Psychiatric/Behavioral:  Negative for dysphoric mood and suicidal ideas. The patient is not nervous/anxious.    All other systems reviewed and are negative.        The patient's medications, allergies, past medical history, surgical history, family history and social history were reviewed and updated where appropriate.    No current outpatient medications on file prior to visit.     No current facility-administered medications on file prior to visit.       No Known Allergies        OBJECTIVE    Visit Vitals  /70 (Site: Left Upper Arm, Position: Sitting, Cuff Size: Large Adult)   Pulse 79   Temp 97.5 °F (36.4 °C) (Temporal)   Resp 16   Ht 1.702 m (5' 7\")   Wt 113.4 kg (250 lb)   SpO2 96%   BMI 39.16 kg/m²       Physical Exam  Vitals and nursing note reviewed.   Constitutional:       Appearance: Normal appearance.   HENT:      Head: Normocephalic and atraumatic.   Eyes:      Extraocular Movements: Extraocular movements intact.      Conjunctiva/sclera: Conjunctivae normal.      Pupils: Pupils are equal, round, and reactive to light.   Cardiovascular:      Rate and Rhythm: Normal rate and regular rhythm.      Pulses: Normal pulses.      Heart sounds: Normal heart sounds. No murmur heard.     No friction rub. No gallop.   Pulmonary:      Effort: Pulmonary effort is normal. No respiratory distress.      Breath sounds: Normal breath sounds. No stridor. No wheezing, rhonchi or rales.   Musculoskeletal:      Cervical back: Normal range of motion.   Skin:     General: Skin is warm.      Findings: No rash.   Neurological:      General: No focal deficit present.      Mental Status: She is alert and oriented to person, place, and time. Mental status is at baseline.         Treatment risks/benefits/costs/interactions/alternatives discussed with patient.  Advised patient to call back or return to office if symptoms worsen/change/persist. If patient cannot reach us or should anything more

## 2024-08-20 ENCOUNTER — PATIENT MESSAGE (OUTPATIENT)
Age: 69
End: 2024-08-20

## 2024-08-21 ENCOUNTER — TELEPHONE (OUTPATIENT)
Age: 69
End: 2024-08-21

## 2024-08-21 RX ORDER — ATORVASTATIN CALCIUM 10 MG/1
10 TABLET, FILM COATED ORAL DAILY
Qty: 90 TABLET | Refills: 1 | Status: SHIPPED | OUTPATIENT
Start: 2024-08-21

## 2024-08-21 NOTE — TELEPHONE ENCOUNTER
Patient called stated provider was suppose to sent in prescription for her A1C and wants to know when she could start the diabetes weight loss program.    Requesting a call back    Best call back # [phone]

## 2024-08-21 NOTE — TELEPHONE ENCOUNTER
Called Patient. Name and  confirmed.  Informed her that she needs to do US first and then Dr. Frenandez will prescribe weekly injection for diabetes. She wants to start cholesterol medication.

## 2024-08-23 ENCOUNTER — HOSPITAL ENCOUNTER (OUTPATIENT)
Facility: HOSPITAL | Age: 69
Discharge: HOME | End: 2024-08-23
Payer: COMMERCIAL

## 2024-08-23 ENCOUNTER — HOSPITAL ENCOUNTER (OUTPATIENT)
Facility: HOSPITAL | Age: 69
End: 2024-08-23
Payer: COMMERCIAL

## 2024-08-23 VITALS — WEIGHT: 250 LBS | BODY MASS INDEX: 39.24 KG/M2 | HEIGHT: 67 IN

## 2024-08-23 DIAGNOSIS — Z12.31 VISIT FOR SCREENING MAMMOGRAM: ICD-10-CM

## 2024-08-23 DIAGNOSIS — R93.2 ABNORMAL GALLBLADDER ULTRASOUND: ICD-10-CM

## 2024-08-23 PROCEDURE — 77067 SCR MAMMO BI INCL CAD: CPT

## 2024-08-23 PROCEDURE — 76700 US EXAM ABDOM COMPLETE: CPT

## 2024-08-28 DIAGNOSIS — K80.20 GALLSTONES: Primary | ICD-10-CM

## 2024-08-28 DIAGNOSIS — R93.2 ABNORMAL GALLBLADDER ULTRASOUND: ICD-10-CM

## 2024-09-11 ENCOUNTER — OFFICE VISIT (OUTPATIENT)
Age: 69
End: 2024-09-11
Payer: COMMERCIAL

## 2024-09-11 VITALS
SYSTOLIC BLOOD PRESSURE: 123 MMHG | BODY MASS INDEX: 38.45 KG/M2 | WEIGHT: 245 LBS | OXYGEN SATURATION: 97 % | TEMPERATURE: 98.2 F | RESPIRATION RATE: 14 BRPM | DIASTOLIC BLOOD PRESSURE: 76 MMHG | HEART RATE: 71 BPM | HEIGHT: 67 IN

## 2024-09-11 DIAGNOSIS — K80.20 CALCULUS OF GALLBLADDER WITHOUT CHOLECYSTITIS WITHOUT OBSTRUCTION: Primary | ICD-10-CM

## 2024-09-11 PROCEDURE — 99203 OFFICE O/P NEW LOW 30 MIN: CPT | Performed by: SURGERY

## 2024-09-11 PROCEDURE — 1123F ACP DISCUSS/DSCN MKR DOCD: CPT | Performed by: SURGERY

## 2024-12-16 ENCOUNTER — OFFICE VISIT (OUTPATIENT)
Age: 69
End: 2024-12-16
Payer: COMMERCIAL

## 2024-12-16 VITALS
DIASTOLIC BLOOD PRESSURE: 68 MMHG | TEMPERATURE: 97.1 F | BODY MASS INDEX: 38.42 KG/M2 | HEIGHT: 67 IN | WEIGHT: 244.8 LBS | OXYGEN SATURATION: 97 % | HEART RATE: 78 BPM | RESPIRATION RATE: 18 BRPM | SYSTOLIC BLOOD PRESSURE: 132 MMHG

## 2024-12-16 DIAGNOSIS — Z87.891 PERSONAL HISTORY OF TOBACCO USE: ICD-10-CM

## 2024-12-16 DIAGNOSIS — E78.5 HYPERLIPIDEMIA, UNSPECIFIED HYPERLIPIDEMIA TYPE: ICD-10-CM

## 2024-12-16 DIAGNOSIS — E11.9 TYPE 2 DIABETES MELLITUS WITHOUT COMPLICATION, WITHOUT LONG-TERM CURRENT USE OF INSULIN (HCC): Primary | ICD-10-CM

## 2024-12-16 PROCEDURE — 3044F HG A1C LEVEL LT 7.0%: CPT | Performed by: FAMILY MEDICINE

## 2024-12-16 PROCEDURE — 99214 OFFICE O/P EST MOD 30 MIN: CPT | Performed by: FAMILY MEDICINE

## 2024-12-16 PROCEDURE — 1123F ACP DISCUSS/DSCN MKR DOCD: CPT | Performed by: FAMILY MEDICINE

## 2024-12-16 NOTE — PATIENT INSTRUCTIONS
follow-up, he or she will help you understand what to do next.  After a lung cancer screening, you can go back to your usual activities right away.  A lung cancer screening test can't tell if you have lung cancer. If your results are positive, your doctor can't tell whether an abnormal finding is a harmless nodule, cancer, or something else without doing more tests.  What can you do to help prevent lung cancer?  Some lung cancers can't be prevented. But if you smoke, quitting smoking is the best step you can take to prevent lung cancer. If you want to quit, your doctor can recommend medicines or other ways to help.  Follow-up care is a key part of your treatment and safety. Be sure to make and go to all appointments, and call your doctor if you are having problems. It's also a good idea to know your test results and keep a list of the medicines you take.  Where can you learn more?  Go to https://www.[employer].me.net/patientEd and enter Q940 to learn more about \"Learning About Lung Cancer Screening.\"  Current as of: October 25, 2023  Content Version: 14.2  © 2024 [employer].   Care instructions adapted under license by [employer]. If you have questions about a medical condition or this instruction, always ask your healthcare professional. [employer] disclaims any warranty or liability for your use of this information.

## 2024-12-16 NOTE — PROGRESS NOTES
Chief Complaint   Patient presents with    Follow-up     \"Have you been to the ER, urgent care clinic since your last visit?  Hospitalized since your last visit?\"    NO    “Have you seen or consulted any other health care providers outside of [facility] since your last visit?”    NO      Financial Resource Strain: Low Risk  (8/19/2024)    Overall Financial Resource Strain (CARDIA)     Difficulty of Paying Living Expenses: Not hard at all      Food Insecurity: No Food Insecurity (8/19/2024)    Hunger Vital Sign     Worried About Running Out of Food in the Last Year: Never true     Ran Out of Food in the Last Year: Never true            8/19/2024     1:52 PM   PHQ-9    Little interest or pleasure in doing things 0   Feeling down, depressed, or hopeless 0   PHQ-2 Score 0   PHQ-9 Total Score 0       Health Maintenance Due   Topic Date Due    Pneumococcal 65+ years Vaccine (1 of 2 - PCV) Never done    Diabetic foot exam  Never done    Diabetic retinal exam  Never done    DTaP/Tdap/Td vaccine (1 - Tdap) Never done    Shingles vaccine (1 of 2) Never done    Respiratory Syncytial Virus (RSV) Pregnant or age 60 yrs+ (1 - Risk 60-74 years 1-dose series) Never done    Hepatitis B vaccine (3 of 3 - Risk 3-dose series) 08/24/2023    Lung Cancer Screening &/or Counseling  12/07/2023    Flu vaccine (1) Never done    COVID-19 Vaccine (1 - 2023-24 season) Never done             
screening scan is positive.  In addition, the patient was counseled regarding the importance of remaining smoke free and/or total smoking cessation.    Also reviewed the following if the patient has Medicare that as of February 10, 2022, Medicare only covers LDCT screening in patients aged 50-77 with at least a 20 pack-year smoking history who currently smoke or have quit in the last 15 years

## 2024-12-17 LAB
ALBUMIN SERPL-MCNC: 3.7 G/DL (ref 3.5–5)
ALBUMIN/GLOB SERPL: 1 (ref 1.1–2.2)
ALP SERPL-CCNC: 83 U/L (ref 45–117)
ALT SERPL-CCNC: 15 U/L (ref 12–78)
ANION GAP SERPL CALC-SCNC: 6 MMOL/L (ref 2–12)
AST SERPL-CCNC: 14 U/L (ref 15–37)
BILIRUB SERPL-MCNC: 0.3 MG/DL (ref 0.2–1)
BUN SERPL-MCNC: 16 MG/DL (ref 6–20)
BUN/CREAT SERPL: 11 (ref 12–20)
CALCIUM SERPL-MCNC: 9.2 MG/DL (ref 8.5–10.1)
CHLORIDE SERPL-SCNC: 103 MMOL/L (ref 97–108)
CHOLEST SERPL-MCNC: 134 MG/DL
CO2 SERPL-SCNC: 25 MMOL/L (ref 21–32)
CREAT SERPL-MCNC: 1.42 MG/DL (ref 0.55–1.02)
ERYTHROCYTE [DISTWIDTH] IN BLOOD BY AUTOMATED COUNT: 14.4 % (ref 11.5–14.5)
EST. AVERAGE GLUCOSE BLD GHB EST-MCNC: 151 MG/DL
GLOBULIN SER CALC-MCNC: 3.8 G/DL (ref 2–4)
GLUCOSE SERPL-MCNC: 95 MG/DL (ref 65–100)
HBA1C MFR BLD: 6.9 % (ref 4–5.6)
HCT VFR BLD AUTO: 41.4 % (ref 35–47)
HDLC SERPL-MCNC: 33 MG/DL
HDLC SERPL: 4.1 (ref 0–5)
HGB BLD-MCNC: 13.2 G/DL (ref 11.5–16)
LDLC SERPL CALC-MCNC: 75.2 MG/DL (ref 0–100)
MCH RBC QN AUTO: 29.2 PG (ref 26–34)
MCHC RBC AUTO-ENTMCNC: 31.9 G/DL (ref 30–36.5)
MCV RBC AUTO: 91.6 FL (ref 80–99)
NRBC # BLD: 0 K/UL (ref 0–0.01)
NRBC BLD-RTO: 0 PER 100 WBC
PLATELET # BLD AUTO: 342 K/UL (ref 150–400)
PMV BLD AUTO: 10 FL (ref 8.9–12.9)
POTASSIUM SERPL-SCNC: 4.3 MMOL/L (ref 3.5–5.1)
PROT SERPL-MCNC: 7.5 G/DL (ref 6.4–8.2)
RBC # BLD AUTO: 4.52 M/UL (ref 3.8–5.2)
SODIUM SERPL-SCNC: 134 MMOL/L (ref 136–145)
TRIGL SERPL-MCNC: 129 MG/DL
VLDLC SERPL CALC-MCNC: 25.8 MG/DL
WBC # BLD AUTO: 13.7 K/UL (ref 3.6–11)

## 2024-12-17 RX ORDER — ATORVASTATIN CALCIUM 10 MG/1
10 TABLET, FILM COATED ORAL DAILY
Qty: 90 TABLET | Refills: 3 | Status: SHIPPED | OUTPATIENT
Start: 2024-12-17

## 2025-02-03 ENCOUNTER — HOSPITAL ENCOUNTER (EMERGENCY)
Facility: HOSPITAL | Age: 70
Discharge: HOME | End: 2025-02-03
Attending: STUDENT IN AN ORGANIZED HEALTH CARE EDUCATION/TRAINING PROGRAM
Payer: COMMERCIAL

## 2025-02-03 ENCOUNTER — OFFICE VISIT (OUTPATIENT)
Age: 70
End: 2025-02-03

## 2025-02-03 VITALS
HEART RATE: 73 BPM | HEIGHT: 67 IN | RESPIRATION RATE: 18 BRPM | WEIGHT: 250 LBS | SYSTOLIC BLOOD PRESSURE: 171 MMHG | DIASTOLIC BLOOD PRESSURE: 68 MMHG | BODY MASS INDEX: 39.24 KG/M2 | OXYGEN SATURATION: 97 % | TEMPERATURE: 98.6 F

## 2025-02-03 VITALS
RESPIRATION RATE: 16 BRPM | HEART RATE: 76 BPM | WEIGHT: 247 LBS | SYSTOLIC BLOOD PRESSURE: 146 MMHG | TEMPERATURE: 98.5 F | BODY MASS INDEX: 38.69 KG/M2 | OXYGEN SATURATION: 97 % | DIASTOLIC BLOOD PRESSURE: 83 MMHG

## 2025-02-03 DIAGNOSIS — L92.3 FOREIGN BODY REACTION OF THE SKIN: Primary | ICD-10-CM

## 2025-02-03 DIAGNOSIS — M79.89 FINGER SWELLING: Primary | ICD-10-CM

## 2025-02-03 PROCEDURE — 99282 EMERGENCY DEPT VISIT SF MDM: CPT

## 2025-02-03 RX ORDER — IBUPROFEN 200 MG
800 TABLET ORAL ONCE
Status: SHIPPED | OUTPATIENT
Start: 2025-02-03

## 2025-02-03 ASSESSMENT — PAIN - FUNCTIONAL ASSESSMENT: PAIN_FUNCTIONAL_ASSESSMENT: NONE - DENIES PAIN

## 2025-02-03 ASSESSMENT — ENCOUNTER SYMPTOMS
SHORTNESS OF BREATH: 0
COUGH: 0
COLOR CHANGE: 1

## 2025-02-03 NOTE — ED TRIAGE NOTES
Patient arrives to ED reports she was \"bit\" by something on right right finger.  Patient reports swelling to a point where she cannot get her ring off.

## 2025-02-03 NOTE — PROGRESS NOTES
Alma Rosa Cerda (:  1955) is a 69 y.o. female,New patient, here for evaluation of the following chief complaint(s):  Joint Swelling (Right ring finger swelling. /X 1 day )          ASSESSMENT/PLAN:    Assessment & Plan  Finger swelling             Discussed with patient our ring cutter is not working has does not appear to be sharp enough needs to go to the emergency department immediately to have ring removed patient is having there now    I have discussed the results, diagnosis and treatment plan with the patient. The patient also understands that early in the process of an illness, an urgent care workup can be falsely reassuring. Routine discharge counseling and specific return precautions discussed with patient and the patient understands that worsening, changing or persistent symptoms should prompt an immediate return to the urgent care or emergency department. Patient/Guardian expressed understanding and agrees with the discharge plan. No further questions at time of discharge.     SUBJECTIVE/OBJECTIVE:  69 y.o. female presents with symptoms of Joint Swelling (Right ring finger swelling. /X 1 day )      69-year-old female presents to urgent care stating she is having swelling in her right hand fourth finger that started overnight she believes she was bit by something when woke up this morning the finger was swollen the swelling has increased throughout the day mild itching no pain but patient is concerned has now finger is starting to turn blue and discolored due to the ring which seems to be strangulating the finger with the swelling            Review of Systems   Constitutional: Negative.    HENT: Negative.     Eyes: Negative.    Respiratory: Negative.     Cardiovascular: Negative.    Gastrointestinal: Negative.    Endocrine: Negative.    Genitourinary:  Negative  Musculoskeletal: Negative.    Skin: See HPI  Allergic/Immunologic: Negative.    Neurological: Negative.    Hematological: Negative.

## 2025-02-03 NOTE — ED NOTES
Ring removal kit broken at this time. Ring taken off of pt's finger by Ambrosio with [employer]. Pt tolerated well. Ring given back to pt. This RN asked the pt to go to the registration desk to be registered and then I would bring her her discharge paperwork. Pt registered and then left before discharge paperwork could be given

## 2025-02-03 NOTE — ED PROVIDER NOTES
[facility] EMERGENCY DEPARTMENT  EMERGENCY DEPARTMENT ENCOUNTER      Pt Name: Alma Rosa Cerda  MRN: [id number]  Birthdate 1955  Date of evaluation: 2/3/2025  Provider: DOMENICO Guadalupe NP    CHIEF COMPLAINT       Chief Complaint   Patient presents with    Ring Removal         HISTORY OF PRESENT ILLNESS   (Location/Symptom, Timing/Onset, Context/Setting, Quality, Duration, Modifying Factors, Severity)  Note limiting factors.   HPI  Patient is a 69-year-old female with past medical history significant for chronic hepatitis C, obesity, asthma, kidney stones, hyperlipidemia and type 2 diabetes who presents to the ED with a ring stuck on her finger.  She states that she thinks she was bit during the night causing her finger to swell.  She has localized bruising around the base of the ring.  She went to an urgent care but they were unable to remove the ring.  She denies any falls, direct injury or blunt trauma.  Good neurovascular sensation.    Review of External Medical Records:     Nursing Notes were reviewed.    REVIEW OF SYSTEMS    (2-9 systems for level 4, 10 or more for level 5)     Review of Systems   Constitutional:  Negative for activity change, appetite change, fever and unexpected weight change.   HENT:  Negative for congestion.    Respiratory:  Negative for cough and shortness of breath.    Cardiovascular:  Negative for chest pain, palpitations and leg swelling.   Genitourinary:  Negative for dysuria.   Musculoskeletal:  Positive for joint swelling.   Skin:  Positive for color change.   Neurological:  Negative for headaches.   All other systems reviewed and are negative.      Except as noted above the remainder of the review of systems was reviewed and negative.       PAST MEDICAL HISTORY     Past Medical History:   Diagnosis Date    Asthma     Chronic hepatitis C without hepatic coma (HCC) 1997    Hydronephrosis of right kidney 03/02/2021    Nephrolithiasis     LOGAN on CPAP 02/03/2023

## 2025-02-03 NOTE — PATIENT INSTRUCTIONS
Discussed with patient our ring cutter is not working has does not appear to be sharp enough needs to go to the emergency department immediately to have ring removed patient is having there now

## 2025-04-01 ENCOUNTER — HOSPITAL ENCOUNTER (OUTPATIENT)
Age: 70
Discharge: HOME | End: 2025-04-04
Payer: COMMERCIAL

## 2025-04-01 DIAGNOSIS — Z87.891 PERSONAL HISTORY OF TOBACCO USE: ICD-10-CM

## 2025-04-01 PROCEDURE — 71271 CT THORAX LUNG CANCER SCR C-: CPT

## 2025-04-03 ENCOUNTER — RESULTS FOLLOW-UP (OUTPATIENT)
Age: 70
End: 2025-04-03

## 2025-07-28 SDOH — ECONOMIC STABILITY: FOOD INSECURITY: WITHIN THE PAST 12 MONTHS, THE FOOD YOU BOUGHT JUST DIDN'T LAST AND YOU DIDN'T HAVE MONEY TO GET MORE.: NEVER TRUE

## 2025-07-28 SDOH — ECONOMIC STABILITY: FOOD INSECURITY: WITHIN THE PAST 12 MONTHS, YOU WORRIED THAT YOUR FOOD WOULD RUN OUT BEFORE YOU GOT MONEY TO BUY MORE.: NEVER TRUE

## 2025-07-28 SDOH — ECONOMIC STABILITY: INCOME INSECURITY: IN THE LAST 12 MONTHS, WAS THERE A TIME WHEN YOU WERE NOT ABLE TO PAY THE MORTGAGE OR RENT ON TIME?: NO

## 2025-07-28 SDOH — ECONOMIC STABILITY: TRANSPORTATION INSECURITY
IN THE PAST 12 MONTHS, HAS THE LACK OF TRANSPORTATION KEPT YOU FROM MEDICAL APPOINTMENTS OR FROM GETTING MEDICATIONS?: NO

## 2025-07-31 ENCOUNTER — OFFICE VISIT (OUTPATIENT)
Age: 70
End: 2025-07-31
Payer: COMMERCIAL

## 2025-07-31 VITALS
DIASTOLIC BLOOD PRESSURE: 66 MMHG | SYSTOLIC BLOOD PRESSURE: 122 MMHG | HEART RATE: 99 BPM | BODY MASS INDEX: 33.53 KG/M2 | OXYGEN SATURATION: 91 % | WEIGHT: 213.6 LBS | HEIGHT: 67 IN | RESPIRATION RATE: 18 BRPM | TEMPERATURE: 97.1 F

## 2025-07-31 DIAGNOSIS — Z23 ENCOUNTER FOR IMMUNIZATION: ICD-10-CM

## 2025-07-31 DIAGNOSIS — Z12.31 VISIT FOR SCREENING MAMMOGRAM: ICD-10-CM

## 2025-07-31 DIAGNOSIS — D72.829 LEUKOCYTOSIS, UNSPECIFIED TYPE: ICD-10-CM

## 2025-07-31 DIAGNOSIS — E11.9 TYPE 2 DIABETES MELLITUS WITHOUT COMPLICATION, WITHOUT LONG-TERM CURRENT USE OF INSULIN (HCC): Primary | ICD-10-CM

## 2025-07-31 DIAGNOSIS — E78.5 HYPERLIPIDEMIA, UNSPECIFIED HYPERLIPIDEMIA TYPE: ICD-10-CM

## 2025-07-31 DIAGNOSIS — E11.9 TYPE 2 DIABETES MELLITUS WITHOUT COMPLICATION, WITHOUT LONG-TERM CURRENT USE OF INSULIN (HCC): ICD-10-CM

## 2025-07-31 PROCEDURE — 1123F ACP DISCUSS/DSCN MKR DOCD: CPT | Performed by: FAMILY MEDICINE

## 2025-07-31 PROCEDURE — 90677 PCV20 VACCINE IM: CPT | Performed by: FAMILY MEDICINE

## 2025-07-31 PROCEDURE — 90471 IMMUNIZATION ADMIN: CPT | Performed by: FAMILY MEDICINE

## 2025-07-31 PROCEDURE — 99214 OFFICE O/P EST MOD 30 MIN: CPT | Performed by: FAMILY MEDICINE

## 2025-07-31 ASSESSMENT — PATIENT HEALTH QUESTIONNAIRE - PHQ9
SUM OF ALL RESPONSES TO PHQ QUESTIONS 1-9: 0
2. FEELING DOWN, DEPRESSED OR HOPELESS: NOT AT ALL
SUM OF ALL RESPONSES TO PHQ QUESTIONS 1-9: 0
1. LITTLE INTEREST OR PLEASURE IN DOING THINGS: NOT AT ALL
SUM OF ALL RESPONSES TO PHQ QUESTIONS 1-9: 0
SUM OF ALL RESPONSES TO PHQ QUESTIONS 1-9: 0

## 2025-07-31 NOTE — PROGRESS NOTES
Patient Name: Alma Rosa Cerda   MRN: [id number]    ASSESSMENT AND PLAN  Alma Rosa Cerda is a 70 y.o. female who presents today for:    Assessment & Plan  Type 2 diabetes mellitus without complication, without long-term current use of insulin (HCC)   Chronic, at goal (stable), continue current plan pending work up below    Orders:    Hemoglobin A1C; Future    Albumin/Creatinine Ratio, Urine; Future    Hyperlipidemia, unspecified hyperlipidemia type   Chronic, at goal (stable), continue current plan pending work up below    Orders:    Comprehensive Metabolic Panel; Future    Lipid Panel; Future    Leukocytosis, unspecified type   Chronic, not at goal (unstable), continue current plan pending work up below. No longer smoking.    Orders:    Path Review, Smear ([facility] Default); Future    CBC with Auto Differential; Future    Visit for screening mammogram   Pt wishes to screen every 2 years.         Encounter for immunization     Orders:    Pneumococcal, PCV20, PREVNAR 20, (age 6w+), IM, PF      No orders of the defined types were placed in this encounter.       There are no discontinued medications.    Return in about 6 months (around 1/31/2026) for DM.      SUBJECTIVE  Alma Rosa Cerda is a 70 y.o. female who presents with the following:     Has intentionally lost weight by quitting smoking, eating better, and getting more exercise.  Feeling well.  Up-to-date with her podiatrist and her eye doctor; no concerns.  Wants to get a mammogram every 2 years; no family history of breast cancer or abnormal mammograms.    Hemoglobin A1C   Date Value Ref Range Status   12/16/2024 6.9 (H) 4.0 - 5.6 % Final     Comment:     (NOTE)  HbA1C Interpretive Ranges  <5.7              Normal  5.7 - 6.4         Consider Prediabetes  >6.5              Consider Diabetes       Wt Readings from Last 3 Encounters:   07/31/25 96.9 kg (213 lb 9.6 oz)   02/03/25 113.4 kg (250 lb)   02/03/25 112 kg (247 lb)     BP Readings from Last 3

## 2025-07-31 NOTE — PROGRESS NOTES
Chief Complaint   Patient presents with    Follow-up     \"Have you been to the ER, urgent care clinic since your last visit?  Hospitalized since your last visit?\"    NO    “Have you seen or consulted any other health care providers outside of [facility] since your last visit?”    NO    Financial Resource Strain: Low Risk  (8/19/2024)    Overall Financial Resource Strain (CARDIA)     Difficulty of Paying Living Expenses: Not hard at all      Food Insecurity: No Food Insecurity (7/28/2025)    Hunger Vital Sign     Worried About Running Out of Food in the Last Year: Never true     Ran Out of Food in the Last Year: Never true            7/31/2025     3:26 PM   PHQ-9    Little interest or pleasure in doing things 0   Feeling down, depressed, or hopeless 0   PHQ-2 Score 0   PHQ-9 Total Score 0       Health Maintenance Due   Topic Date Due    Diabetic foot exam  Never done    Diabetic retinal exam  Never done    DTaP/Tdap/Td vaccine (1 - Tdap) Never done    Pneumococcal 50+ years Vaccine (1 of 2 - PCV) Never done    Shingles vaccine (1 of 2) Never done    Respiratory Syncytial Virus (RSV) Pregnant or age 60 yrs+ (1 - Risk 60-74 years 1-dose series) Never done    COVID-19 Vaccine (1 - 2024-25 season) Never done    Diabetic Alb to Cr ratio (uACR) test  08/19/2025    Depression Screen  08/19/2025

## 2025-08-01 LAB
ALBUMIN SERPL-MCNC: 3.9 G/DL (ref 3.8–4.8)
ALP SERPL-CCNC: 108 IU/L (ref 44–121)
ALT SERPL-CCNC: 25 IU/L (ref 0–32)
AST SERPL-CCNC: 19 IU/L (ref 0–40)
BASOPHILS # BLD AUTO: 0 X10E3/UL (ref 0–0.2)
BASOPHILS NFR BLD AUTO: 0 %
BILIRUB SERPL-MCNC: <0.2 MG/DL (ref 0–1.2)
BUN SERPL-MCNC: 18 MG/DL (ref 8–27)
BUN/CREAT SERPL: 19 (ref 12–28)
CALCIUM SERPL-MCNC: 10.4 MG/DL (ref 8.7–10.3)
CHLORIDE SERPL-SCNC: 105 MMOL/L (ref 96–106)
CHOLEST SERPL-MCNC: 90 MG/DL (ref 100–199)
CO2 SERPL-SCNC: 21 MMOL/L (ref 20–29)
CREAT SERPL-MCNC: 0.97 MG/DL (ref 0.57–1)
EGFRCR SERPLBLD CKD-EPI 2021: 62 ML/MIN/1.73
EOSINOPHIL # BLD AUTO: 0.1 X10E3/UL (ref 0–0.4)
EOSINOPHIL NFR BLD AUTO: 1 %
ERYTHROCYTE [DISTWIDTH] IN BLOOD BY AUTOMATED COUNT: 12.1 % (ref 11.7–15.4)
EST. AVERAGE GLUCOSE BLD GHB EST-MCNC: 128 MG/DL
GLOBULIN SER CALC-MCNC: 2.7 G/DL (ref 1.5–4.5)
GLUCOSE SERPL-MCNC: 96 MG/DL (ref 70–99)
HBA1C MFR BLD: 6.1 % (ref 4.8–5.6)
HCT VFR BLD AUTO: 39.6 % (ref 34–46.6)
HDLC SERPL-MCNC: 29 MG/DL
HGB BLD-MCNC: 12.4 G/DL (ref 11.1–15.9)
IMM GRANULOCYTES # BLD AUTO: 0 X10E3/UL (ref 0–0.1)
IMM GRANULOCYTES NFR BLD AUTO: 0 %
LDLC SERPL CALC-MCNC: 40 MG/DL (ref 0–99)
LYMPHOCYTES # BLD AUTO: 2.6 X10E3/UL (ref 0.7–3.1)
LYMPHOCYTES NFR BLD AUTO: 28 %
MCH RBC QN AUTO: 27.6 PG (ref 26.6–33)
MCHC RBC AUTO-ENTMCNC: 31.3 G/DL (ref 31.5–35.7)
MCV RBC AUTO: 88 FL (ref 79–97)
MONOCYTES # BLD AUTO: 1 X10E3/UL (ref 0.1–0.9)
MONOCYTES NFR BLD AUTO: 11 %
NEUTROPHILS # BLD AUTO: 5.3 X10E3/UL (ref 1.4–7)
NEUTROPHILS NFR BLD AUTO: 60 %
PLATELET # BLD AUTO: 320 X10E3/UL (ref 150–450)
POTASSIUM SERPL-SCNC: 4.6 MMOL/L (ref 3.5–5.2)
PROT SERPL-MCNC: 6.6 G/DL (ref 6–8.5)
RBC # BLD AUTO: 4.49 X10E6/UL (ref 3.77–5.28)
SODIUM SERPL-SCNC: 141 MMOL/L (ref 134–144)
TRIGL SERPL-MCNC: 116 MG/DL (ref 0–149)
VLDLC SERPL CALC-MCNC: 21 MG/DL (ref 5–40)
WBC # BLD AUTO: 9 X10E3/UL (ref 3.4–10.8)

## 2025-08-02 LAB
ALBUMIN/CREAT UR: 6 MG/G CREAT (ref 0–29)
CREAT UR-MCNC: 95.9 MG/DL
IMP & REVIEW OF LAB RESULTS: NORMAL
MICROALBUMIN UR-MCNC: 5.5 UG/ML

## 2025-08-25 ENCOUNTER — TELEPHONE (OUTPATIENT)
Age: 70
End: 2025-08-25

## 2025-09-05 ENCOUNTER — OFFICE VISIT (OUTPATIENT)
Age: 70
End: 2025-09-05
Payer: COMMERCIAL

## 2025-09-05 VITALS
HEIGHT: 67 IN | OXYGEN SATURATION: 98 % | WEIGHT: 199.6 LBS | DIASTOLIC BLOOD PRESSURE: 76 MMHG | HEART RATE: 77 BPM | RESPIRATION RATE: 20 BRPM | TEMPERATURE: 97.8 F | BODY MASS INDEX: 31.33 KG/M2 | SYSTOLIC BLOOD PRESSURE: 126 MMHG

## 2025-09-05 DIAGNOSIS — E78.5 HYPERLIPIDEMIA, UNSPECIFIED HYPERLIPIDEMIA TYPE: ICD-10-CM

## 2025-09-05 DIAGNOSIS — R29.898 BILATERAL LEG WEAKNESS: Primary | ICD-10-CM

## 2025-09-05 DIAGNOSIS — E11.9 TYPE 2 DIABETES MELLITUS WITHOUT COMPLICATION, WITHOUT LONG-TERM CURRENT USE OF INSULIN (HCC): ICD-10-CM

## 2025-09-05 DIAGNOSIS — M79.10 MYALGIA: ICD-10-CM

## 2025-09-05 PROCEDURE — 1123F ACP DISCUSS/DSCN MKR DOCD: CPT | Performed by: FAMILY MEDICINE

## 2025-09-05 PROCEDURE — 99214 OFFICE O/P EST MOD 30 MIN: CPT | Performed by: FAMILY MEDICINE

## 2025-09-05 PROCEDURE — 3044F HG A1C LEVEL LT 7.0%: CPT | Performed by: FAMILY MEDICINE

## 2025-09-05 PROCEDURE — G2211 COMPLEX E/M VISIT ADD ON: HCPCS | Performed by: FAMILY MEDICINE

## 2025-09-05 RX ORDER — ROSUVASTATIN CALCIUM 5 MG/1
5 TABLET, COATED ORAL DAILY
Qty: 30 TABLET | Refills: 5 | Status: SHIPPED | OUTPATIENT
Start: 2025-09-05

## 2025-09-05 ASSESSMENT — PATIENT HEALTH QUESTIONNAIRE - PHQ9
1. LITTLE INTEREST OR PLEASURE IN DOING THINGS: NOT AT ALL
SUM OF ALL RESPONSES TO PHQ QUESTIONS 1-9: 0
2. FEELING DOWN, DEPRESSED OR HOPELESS: NOT AT ALL
SUM OF ALL RESPONSES TO PHQ QUESTIONS 1-9: 0

## 2025-09-06 LAB
ALBUMIN SERPL-MCNC: 2.9 G/DL (ref 3.5–5.2)
ALBUMIN/GLOB SERPL: 0.9 (ref 1.1–2.2)
ALP SERPL-CCNC: 102 U/L (ref 35–104)
ALT SERPL-CCNC: 19 U/L (ref 10–35)
ANION GAP SERPL CALC-SCNC: 11 MMOL/L (ref 2–14)
AST SERPL-CCNC: 17 U/L (ref 10–35)
BASOPHILS # BLD: 0.05 K/UL (ref 0–0.1)
BASOPHILS NFR BLD: 0.6 % (ref 0–1)
BILIRUB SERPL-MCNC: 0.2 MG/DL (ref 0–1.2)
BUN SERPL-MCNC: 13 MG/DL (ref 8–23)
BUN/CREAT SERPL: 16 (ref 12–20)
CALCIUM SERPL-MCNC: 10.1 MG/DL (ref 8.8–10.2)
CHLORIDE SERPL-SCNC: 107 MMOL/L (ref 98–107)
CK SERPL-CCNC: 26 U/L (ref 26–192)
CO2 SERPL-SCNC: 26 MMOL/L (ref 20–29)
CREAT SERPL-MCNC: 0.78 MG/DL (ref 0.6–1)
CRP SERPL-MCNC: 1.3 MG/DL (ref 0–0.5)
DIFFERENTIAL METHOD BLD: ABNORMAL
EOSINOPHIL # BLD: 0.17 K/UL (ref 0–0.4)
EOSINOPHIL NFR BLD: 1.9 % (ref 0–7)
ERYTHROCYTE [DISTWIDTH] IN BLOOD BY AUTOMATED COUNT: 13 % (ref 11.5–14.5)
ERYTHROCYTE [SEDIMENTATION RATE] IN BLOOD: 41 MM/HR (ref 0–30)
FOLATE SERPL-MCNC: 22.1 NG/ML (ref 4.8–24.2)
GLOBULIN SER CALC-MCNC: 3.1 G/DL (ref 2–4)
GLUCOSE SERPL-MCNC: 141 MG/DL (ref 65–100)
HCT VFR BLD AUTO: 35 % (ref 35–47)
HGB BLD-MCNC: 10.9 G/DL (ref 11.5–16)
IMM GRANULOCYTES # BLD AUTO: 0.02 K/UL (ref 0–0.04)
IMM GRANULOCYTES NFR BLD AUTO: 0.2 % (ref 0–0.5)
LYMPHOCYTES # BLD: 2.49 K/UL (ref 0.8–3.5)
LYMPHOCYTES NFR BLD: 27.5 % (ref 12–49)
MAGNESIUM SERPL-MCNC: 1.9 MG/DL (ref 1.6–2.4)
MCH RBC QN AUTO: 27.3 PG (ref 26–34)
MCHC RBC AUTO-ENTMCNC: 31.1 G/DL (ref 30–36.5)
MCV RBC AUTO: 87.7 FL (ref 80–99)
MONOCYTES # BLD: 0.89 K/UL (ref 0–1)
MONOCYTES NFR BLD: 9.8 % (ref 5–13)
NEUTS SEG # BLD: 5.43 K/UL (ref 1.8–8)
NEUTS SEG NFR BLD: 60 % (ref 32–75)
NRBC # BLD: 0 K/UL (ref 0–0.01)
NRBC BLD-RTO: 0 PER 100 WBC
PHOSPHATE SERPL-MCNC: 3.9 MG/DL (ref 2.4–4.5)
PLATELET # BLD AUTO: 306 K/UL (ref 150–400)
PMV BLD AUTO: 10.7 FL (ref 8.9–12.9)
POTASSIUM SERPL-SCNC: 3.8 MMOL/L (ref 3.5–5.1)
PROT SERPL-MCNC: 5.9 G/DL (ref 6.4–8.3)
RBC # BLD AUTO: 3.99 M/UL (ref 3.8–5.2)
SODIUM SERPL-SCNC: 144 MMOL/L (ref 136–145)
T4 FREE SERPL-MCNC: 4.5 NG/DL (ref 0.9–1.6)
TSH, 3RD GENERATION: <0.005 UIU/ML (ref 0.27–4.2)
VIT B12 SERPL-MCNC: 3017 PG/ML (ref 232–1245)
WBC # BLD AUTO: 9.1 K/UL (ref 3.6–11)